# Patient Record
Sex: MALE | Race: WHITE | NOT HISPANIC OR LATINO | Employment: FULL TIME | URBAN - METROPOLITAN AREA
[De-identification: names, ages, dates, MRNs, and addresses within clinical notes are randomized per-mention and may not be internally consistent; named-entity substitution may affect disease eponyms.]

---

## 2017-03-27 ENCOUNTER — TELEPHONE (OUTPATIENT)
Dept: FAMILY MEDICINE CLINIC | Facility: CLINIC | Age: 32
End: 2017-03-27

## 2017-03-27 NOTE — TELEPHONE ENCOUNTER
Lm informing we could work him in tomorrow as a same day to call and let me know    ----- Message from Binta Prasad sent at 3/27/2017  9:35 AM EDT -----  Contact: [phone]  PT WANTS TO SEE DR. DOHERTY FOR A RASH ON HIS HANDS AND CHEST BUT JUST IN CASE YOU CAN'T WORK HIM IN I MADE AN APPT WITH [facility] ON Wednesday. PLEASE CALL AND LET HIM KNOW

## 2017-03-29 ENCOUNTER — OFFICE VISIT (OUTPATIENT)
Dept: FAMILY MEDICINE CLINIC | Facility: CLINIC | Age: 32
End: 2017-03-29

## 2017-03-29 VITALS
WEIGHT: 243 LBS | SYSTOLIC BLOOD PRESSURE: 120 MMHG | TEMPERATURE: 98.3 F | HEART RATE: 91 BPM | BODY MASS INDEX: 39.05 KG/M2 | DIASTOLIC BLOOD PRESSURE: 68 MMHG | OXYGEN SATURATION: 98 % | HEIGHT: 66 IN

## 2017-03-29 DIAGNOSIS — J30.2 SEASONAL ALLERGIC RHINITIS, UNSPECIFIED ALLERGIC RHINITIS TRIGGER: ICD-10-CM

## 2017-03-29 DIAGNOSIS — L30.9 DERMATITIS: Primary | ICD-10-CM

## 2017-03-29 DIAGNOSIS — R21 RASH: ICD-10-CM

## 2017-03-29 PROCEDURE — 96372 THER/PROPH/DIAG INJ SC/IM: CPT | Performed by: NURSE PRACTITIONER

## 2017-03-29 PROCEDURE — 99213 OFFICE O/P EST LOW 20 MIN: CPT | Performed by: NURSE PRACTITIONER

## 2017-03-29 RX ORDER — HYDROXYZINE HYDROCHLORIDE 25 MG/1
25 TABLET, FILM COATED ORAL EVERY 8 HOURS PRN
Qty: 60 TABLET | Refills: 0 | Status: SHIPPED | OUTPATIENT
Start: 2017-03-29 | End: 2017-09-08

## 2017-03-29 RX ORDER — METHYLPREDNISOLONE 4 MG/1
TABLET ORAL
Qty: 21 TABLET | Refills: 0 | Status: SHIPPED | OUTPATIENT
Start: 2017-03-29 | End: 2017-09-08

## 2017-03-29 RX ORDER — METHYLPREDNISOLONE ACETATE 40 MG/ML
40 INJECTION, SUSPENSION INTRA-ARTICULAR; INTRALESIONAL; INTRAMUSCULAR; SOFT TISSUE ONCE
Status: COMPLETED | OUTPATIENT
Start: 2017-03-29 | End: 2017-03-29

## 2017-03-29 RX ORDER — TRIAMCINOLONE ACETONIDE 1 MG/G
CREAM TOPICAL 2 TIMES DAILY
Qty: 30 G | Refills: 1 | Status: SHIPPED | OUTPATIENT
Start: 2017-03-29 | End: 2019-05-21 | Stop reason: SDUPTHER

## 2017-03-29 RX ORDER — CETIRIZINE HYDROCHLORIDE 10 MG/1
10 TABLET ORAL DAILY
Qty: 30 TABLET | Refills: 1 | Status: SHIPPED | OUTPATIENT
Start: 2017-03-29 | End: 2017-09-08

## 2017-03-29 RX ORDER — METHYLPREDNISOLONE ACETATE 40 MG/ML
20 INJECTION, SUSPENSION INTRA-ARTICULAR; INTRALESIONAL; INTRAMUSCULAR; SOFT TISSUE ONCE
Status: COMPLETED | OUTPATIENT
Start: 2017-03-29 | End: 2017-03-29

## 2017-03-29 RX ADMIN — METHYLPREDNISOLONE ACETATE 40 MG: 40 INJECTION, SUSPENSION INTRA-ARTICULAR; INTRALESIONAL; INTRAMUSCULAR; SOFT TISSUE at 14:58

## 2017-03-29 RX ADMIN — METHYLPREDNISOLONE ACETATE 20 MG: 40 INJECTION, SUSPENSION INTRA-ARTICULAR; INTRALESIONAL; INTRAMUSCULAR; SOFT TISSUE at 14:58

## 2017-03-29 NOTE — PROGRESS NOTES
Subjective   Rajat Anguiano is a 32 y.o. male.     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    Review of Systems   Constitutional: Negative for fever.   Respiratory: Negative for cough, shortness of breath and wheezing.    Cardiovascular: Negative for chest pain, palpitations and leg swelling.   Gastrointestinal: Negative for nausea and vomiting.   Skin: Positive for rash.        Itching   Neurological: Negative for dizziness and headaches.   All other systems reviewed and are negative.      Objective   Physical Exam   Constitutional: He is oriented to person, place, and time. He appears well-developed and well-nourished.   HENT:   Head: Normocephalic and atraumatic.   Eyes: Conjunctivae and EOM are normal. Pupils are equal, round, and reactive to light.   Cardiovascular: Normal rate, regular rhythm and normal heart sounds.    Pulmonary/Chest: Effort normal and breath sounds normal.   Musculoskeletal: Normal range of motion.   Neurological: He is alert and oriented to person, place, and time.   Skin: Skin is warm and dry. Rash (erythematous papules and  patches B hands, B UE, B posterior knee, B antecubital fossa, B chest) noted.   Psychiatric: He has a normal mood and affect. His behavior is normal. Judgment and thought content normal.   Vitals reviewed.      Assessment/Plan   Rajat was seen today for rash.    Diagnoses and all orders for this visit:    Dermatitis  -     Ambulatory Referral to Allergy  -     Ambulatory Referral to Dermatology    Seasonal allergic rhinitis, unspecified allergic rhinitis trigger  -     methylPREDNISolone acetate (DEPO-medrol) injection 40 mg; Inject 1 mL into the shoulder, thigh, or buttocks 1 (One) Time.  -     methylPREDNISolone acetate (DEPO-medrol) injection 20 mg; Inject 0.5 mL into the shoulder, thigh, or buttocks 1 (One) Time.  -     Ambulatory Referral to Allergy    Rash  -     Ambulatory Referral to Dermatology    Other orders  -     MethylPREDNISolone (MEDROL, STEVEN,) 4 MG tablet; Take as directed on package instructions.  -     hydrOXYzine (ATARAX) 25 MG tablet; Take 1 tablet by mouth Every 8 (Eight) Hours As Needed for Itching.  -     triamcinolone (KENALOG) 0.1 % cream; Apply  topically 2 (Two) Times a Day.  -     cetirizine (zyrTEC) 10 MG tablet; Take 1 tablet by mouth Daily.    depo medrol 60 mg today, start medrol dose pack tomorrow, erx hydroxyzine prn itching, erx triamcinolone for rash and base of neck, gave referral for [facility] and derm pt will schedule own FU apt, start zyrtec 10 mg daily, if sx persist or worsen or vision changes call or RTC or ER

## 2017-03-29 NOTE — PATIENT INSTRUCTIONS
depo medrol 60 mg today, start medrol dose pack tomorrow, erx hydroxyzine prn itching, erx triamcinolone for rash and base of neck, gave referral for [facility] and derm pt will schedule own FU apt, start zyrtec 10 mg daily, if sx persist or worsen or vision changes call or RTC or ER

## 2017-07-24 RX ORDER — LISINOPRIL 10 MG/1
TABLET ORAL
Qty: 30 TABLET | Refills: 0 | Status: SHIPPED | OUTPATIENT
Start: 2017-07-24 | End: 2017-09-08 | Stop reason: SDUPTHER

## 2017-08-23 RX ORDER — LISINOPRIL 10 MG/1
TABLET ORAL
Qty: 30 TABLET | Refills: 0 | Status: SHIPPED | OUTPATIENT
Start: 2017-08-23 | End: 2017-09-08 | Stop reason: SDUPTHER

## 2017-09-08 ENCOUNTER — OFFICE VISIT (OUTPATIENT)
Dept: FAMILY MEDICINE CLINIC | Facility: CLINIC | Age: 32
End: 2017-09-08

## 2017-09-08 VITALS
OXYGEN SATURATION: 96 % | HEIGHT: 66 IN | RESPIRATION RATE: 16 BRPM | DIASTOLIC BLOOD PRESSURE: 58 MMHG | TEMPERATURE: 98.2 F | BODY MASS INDEX: 38.92 KG/M2 | SYSTOLIC BLOOD PRESSURE: 90 MMHG | WEIGHT: 242.2 LBS | HEART RATE: 89 BPM

## 2017-09-08 DIAGNOSIS — R10.9 RIGHT FLANK PAIN: ICD-10-CM

## 2017-09-08 DIAGNOSIS — M54.50 ACUTE MIDLINE LOW BACK PAIN WITHOUT SCIATICA: Primary | ICD-10-CM

## 2017-09-08 LAB
BACTERIA UR QL AUTO: NORMAL /HPF
BILIRUB UR QL STRIP: NEGATIVE
CLARITY UR: CLEAR
COLOR UR: YELLOW
GLUCOSE UR STRIP-MCNC: NEGATIVE MG/DL
HGB UR QL STRIP.AUTO: NEGATIVE
KETONES UR QL STRIP: NEGATIVE
LEUKOCYTE ESTERASE UR QL STRIP.AUTO: NEGATIVE
NITRITE UR QL STRIP: NEGATIVE
PH UR STRIP.AUTO: 5.5 [PH] (ref 4.6–8)
PROT UR QL STRIP: NEGATIVE
RBC # UR: NORMAL /HPF
REF LAB TEST METHOD: NORMAL
SP GR UR STRIP: 1.02 (ref 1–1.03)
SQUAMOUS #/AREA URNS HPF: NORMAL /HPF
UROBILINOGEN UR QL STRIP: NORMAL
WBC UR QL AUTO: NORMAL /HPF

## 2017-09-08 PROCEDURE — 99213 OFFICE O/P EST LOW 20 MIN: CPT | Performed by: NURSE PRACTITIONER

## 2017-09-08 PROCEDURE — 81001 URINALYSIS AUTO W/SCOPE: CPT | Performed by: NURSE PRACTITIONER

## 2017-09-08 PROCEDURE — 72100 X-RAY EXAM L-S SPINE 2/3 VWS: CPT | Performed by: NURSE PRACTITIONER

## 2017-09-08 RX ORDER — LISINOPRIL 10 MG/1
10 TABLET ORAL
COMMUNITY
End: 2018-05-09

## 2017-09-08 RX ORDER — CYCLOBENZAPRINE HCL 10 MG
10 TABLET ORAL NIGHTLY PRN
Qty: 30 TABLET | Refills: 0 | Status: SHIPPED | OUTPATIENT
Start: 2017-09-08 | End: 2019-05-21 | Stop reason: SDUPTHER

## 2017-09-08 NOTE — PROGRESS NOTES
Subjective   Rajat Anguiano is a 32 y.o. male.     History of Present Illness   C/o low back and right flank pain, he states he has dull pain on right side, he states it gets better throughout the day. He has a hx of LBP, he states symptoms started a couple of weeks ago, he did physical activity with work and got worse. He denies fever, he denies urinary symptoms. He states his back is stiff, he has had some sharp pain, denies any hx of kidney stones, he tried ice and aleve at home, he had a lumbar xray 7/16. He states the aleve helps some. Back pain ongoing, no injury that he recalls, he saw a chiropractor which helped.     The following portions of the patient's history were reviewed and updated as appropriate: allergies, current medications, past family history, past medical history, past social history, past surgical history and problem list.    Review of Systems   Constitutional: Negative for chills, diaphoresis and fever.   Respiratory: Negative for shortness of breath.    Cardiovascular: Negative for chest pain.   Genitourinary: Positive for flank pain. Negative for difficulty urinating, discharge, frequency and penile pain.   Musculoskeletal: Positive for arthralgias, back pain and myalgias.   Skin: Negative for pallor.   Neurological: Negative for dizziness, light-headedness and headaches.   All other systems reviewed and are negative.      Objective   Physical Exam   Constitutional: He is oriented to person, place, and time. He appears well-developed and well-nourished.   HENT:   Head: Normocephalic.   Eyes: Pupils are equal, round, and reactive to light.   Neck: Neck supple.   Cardiovascular: Normal rate, regular rhythm and normal heart sounds.    Pulmonary/Chest: Effort normal and breath sounds normal.   Abdominal: Soft. Bowel sounds are normal. He exhibits no distension and no mass. There is no hepatosplenomegaly. There is no tenderness. There is no CVA tenderness.   Musculoskeletal:        Lumbar back:  He exhibits decreased range of motion and tenderness. He exhibits no bony tenderness, no pain and no spasm.   Neurological: He is alert and oriented to person, place, and time.   Skin: Skin is warm and dry.   Psychiatric: He has a normal mood and affect. His behavior is normal. Judgment and thought content normal.   Nursing note and vitals reviewed.  lumbar xray today for low back pain, compared to xray from 7/2/16, shows NAD, awaiting radiology over read.     Assessment/Plan   Rajat was seen today for flank pain.    Diagnoses and all orders for this visit:    Acute midline low back pain without sciatica  -     Urinalysis With Microscopic  -     Urinalysis  -     Urinalysis, Microscopic Only  -     Cancel: XR Spine Lumbar 4+ View (In Office)  -     XR Spine Lumbar 2 or 3 View (In Office)    Right flank pain  -     Urinalysis With Microscopic  -     Urinalysis  -     Urinalysis, Microscopic Only    Other orders  -     cyclobenzaprine (FLEXERIL) 10 MG tablet; Take 1 tablet by mouth At Night As Needed for Muscle Spasms.    UA today, WNL.  May cont to use heat or ice alternating as needed.  Lumbar xray today, will call with results.  Flexeril 10mg nightly as needed for pain or spasm.  May cont aleve OTC as needed.   Pending xray will consider mri, ortho, and PT.   Increase fluid intake, get plenty of rest.   Patient agrees with plan of care and understands instructions. Call if worsening symptoms or any problems or concerns.

## 2017-09-08 NOTE — PATIENT INSTRUCTIONS
UA today, WNL.  May cont to use heat or ice alternating as needed.  Lumbar xray today, will call with results.  Flexeril 10mg nightly as needed for pain or spasm.  May cont aleve OTC as needed.   Pending xray will consider mri, ortho, and PT.   Increase fluid intake, get plenty of rest.   Patient agrees with plan of care and understands instructions. Call if worsening symptoms or any problems or concerns.

## 2017-09-13 ENCOUNTER — TELEPHONE (OUTPATIENT)
Dept: FAMILY MEDICINE CLINIC | Facility: CLINIC | Age: 32
End: 2017-09-13

## 2017-09-13 DIAGNOSIS — M43.00 SPONDYLOLYSIS: Primary | ICD-10-CM

## 2017-09-13 NOTE — TELEPHONE ENCOUNTER
----- Message from CHAUNCEY Ramirez sent at 9/13/2017  2:58 PM EDT -----  Please call patient with results. Xray WNL< shows acute spondolylosis at L5-S1, will refer to ortho for this and call with appt.    LMTRC

## 2017-09-15 ENCOUNTER — TELEPHONE (OUTPATIENT)
Dept: FAMILY MEDICINE CLINIC | Facility: CLINIC | Age: 32
End: 2017-09-15

## 2017-09-21 RX ORDER — LISINOPRIL 10 MG/1
TABLET ORAL
Qty: 30 TABLET | Refills: 0 | Status: SHIPPED | OUTPATIENT
Start: 2017-09-21 | End: 2018-05-09 | Stop reason: SDUPTHER

## 2017-10-18 RX ORDER — LISINOPRIL 10 MG/1
TABLET ORAL
Qty: 30 TABLET | Refills: 0 | Status: SHIPPED | OUTPATIENT
Start: 2017-10-18 | End: 2017-11-15 | Stop reason: SDUPTHER

## 2017-11-15 RX ORDER — LISINOPRIL 10 MG/1
10 TABLET ORAL DAILY
Qty: 30 TABLET | Refills: 5 | Status: SHIPPED | OUTPATIENT
Start: 2017-11-15 | End: 2018-05-09

## 2018-05-09 ENCOUNTER — OFFICE VISIT (OUTPATIENT)
Dept: FAMILY MEDICINE CLINIC | Facility: CLINIC | Age: 33
End: 2018-05-09

## 2018-05-09 VITALS
DIASTOLIC BLOOD PRESSURE: 60 MMHG | OXYGEN SATURATION: 98 % | BODY MASS INDEX: 37.7 KG/M2 | HEART RATE: 94 BPM | TEMPERATURE: 98.7 F | HEIGHT: 66 IN | RESPIRATION RATE: 16 BRPM | SYSTOLIC BLOOD PRESSURE: 90 MMHG | WEIGHT: 234.6 LBS

## 2018-05-09 DIAGNOSIS — Z87.898 H/O EPISTAXIS: ICD-10-CM

## 2018-05-09 DIAGNOSIS — I10 ESSENTIAL HYPERTENSION: Primary | ICD-10-CM

## 2018-05-09 DIAGNOSIS — F41.9 ANXIETY: ICD-10-CM

## 2018-05-09 DIAGNOSIS — Z00.00 HEALTHCARE MAINTENANCE: ICD-10-CM

## 2018-05-09 LAB
ALBUMIN SERPL-MCNC: 4.8 G/DL (ref 3.5–5.2)
ALBUMIN/GLOB SERPL: 1.6 G/DL
ALP SERPL-CCNC: 53 U/L (ref 39–117)
ALT SERPL W P-5'-P-CCNC: 33 U/L (ref 1–41)
ANION GAP SERPL CALCULATED.3IONS-SCNC: 12.8 MMOL/L
AST SERPL-CCNC: 23 U/L (ref 1–40)
BILIRUB SERPL-MCNC: 0.6 MG/DL (ref 0.1–1.2)
BUN BLD-MCNC: 12 MG/DL (ref 6–20)
BUN/CREAT SERPL: 13.5 (ref 7–25)
CALCIUM SPEC-SCNC: 10.4 MG/DL (ref 8.6–10.5)
CHLORIDE SERPL-SCNC: 99 MMOL/L (ref 98–107)
CO2 SERPL-SCNC: 25.2 MMOL/L (ref 22–29)
CREAT BLD-MCNC: 0.89 MG/DL (ref 0.76–1.27)
ERYTHROCYTE [DISTWIDTH] IN BLOOD BY AUTOMATED COUNT: 12.8 % (ref 4.5–15)
GFR SERPL CREATININE-BSD FRML MDRD: 100 ML/MIN/1.73
GLOBULIN UR ELPH-MCNC: 3 GM/DL
GLUCOSE BLD-MCNC: 85 MG/DL (ref 65–99)
HCT VFR BLD AUTO: 48 % (ref 35–60)
HGB BLD-MCNC: 15.9 G/DL (ref 13.5–18)
LYMPHOCYTES # BLD AUTO: 3 10*3/MM3 (ref 1.2–3.4)
LYMPHOCYTES NFR BLD AUTO: 31.5 % (ref 21–51)
MCH RBC QN AUTO: 28.2 PG (ref 26.1–33.1)
MCHC RBC AUTO-ENTMCNC: 33.1 G/DL (ref 33–37)
MCV RBC AUTO: 85.1 FL (ref 80–99)
MONOCYTES # BLD AUTO: 0.8 10*3/MM3 (ref 0.1–0.6)
MONOCYTES NFR BLD AUTO: 8.7 % (ref 2–9)
NEUTROPHILS # BLD AUTO: 5.7 10*3/MM3 (ref 1.4–6.5)
NEUTROPHILS NFR BLD AUTO: 59.8 % (ref 42–75)
PLATELET # BLD AUTO: 256 10*3/MM3 (ref 150–450)
PMV BLD AUTO: 8.3 FL (ref 7.1–10.5)
POTASSIUM BLD-SCNC: 4.4 MMOL/L (ref 3.5–5.2)
PROT SERPL-MCNC: 7.8 G/DL (ref 6–8.5)
RBC # BLD AUTO: 5.64 10*6/MM3 (ref 4–6)
SODIUM BLD-SCNC: 137 MMOL/L (ref 136–145)
TSH SERPL DL<=0.05 MIU/L-ACNC: 1.6 MIU/ML (ref 0.27–4.2)
WBC NRBC COR # BLD: 9.6 10*3/MM3 (ref 4.5–10)

## 2018-05-09 PROCEDURE — 80053 COMPREHEN METABOLIC PANEL: CPT | Performed by: NURSE PRACTITIONER

## 2018-05-09 PROCEDURE — 36415 COLL VENOUS BLD VENIPUNCTURE: CPT | Performed by: NURSE PRACTITIONER

## 2018-05-09 PROCEDURE — 85025 COMPLETE CBC W/AUTO DIFF WBC: CPT | Performed by: NURSE PRACTITIONER

## 2018-05-09 PROCEDURE — 99214 OFFICE O/P EST MOD 30 MIN: CPT | Performed by: NURSE PRACTITIONER

## 2018-05-09 PROCEDURE — 84443 ASSAY THYROID STIM HORMONE: CPT | Performed by: NURSE PRACTITIONER

## 2018-05-09 RX ORDER — HYDROXYZINE HYDROCHLORIDE 25 MG/1
25 TABLET, FILM COATED ORAL NIGHTLY
Qty: 30 TABLET | Refills: 1 | Status: SHIPPED | OUTPATIENT
Start: 2018-05-09 | End: 2018-06-13 | Stop reason: SDUPTHER

## 2018-05-09 RX ORDER — LISINOPRIL 10 MG/1
10 TABLET ORAL DAILY
Qty: 30 TABLET | Refills: 6 | Status: SHIPPED | OUTPATIENT
Start: 2018-05-09 | End: 2018-12-10 | Stop reason: SDUPTHER

## 2018-05-09 NOTE — PROGRESS NOTES
Subjective   Rajat Anguiano is a 33 y.o. male.     History of Present Illness   Here today to BP med refill, currently on lisinopril 10mg daily, he has lost weight, he has been walking on lunch, working on diet, he did take BP meds today. He is over due for labs, not fasting today. He does not check, BP at home, he denies dizziness, HA occasionally, denies CP, SOA, LE edema.   He also c/o anxiety, worse over there past couple of month, feeling nervousness, panic attack in middle of night, he denies any recent stressors, new house but excited, states anxiety started about 4-5 months ago, he states he has trouble staying asleep. He has had depression before, has tried meds on and off, he states he is not feeling depressed but feels anxious.   He states his thoughts run, over thinks.   He also c/o nose bleeds, he had nose cauterized last year, he states he has bloody mucous when blows nose at times, he states more left nose, he denies nose bleeds, he denies sinus pressure, denies drainage.     The following portions of the patient's history were reviewed and updated as appropriate: allergies, current medications, past family history, past medical history, past social history, past surgical history and problem list.    Review of Systems   Constitutional: Negative for chills, diaphoresis and fever.   HENT: Negative for nosebleeds, postnasal drip, rhinorrhea and sinus pressure.    Eyes: Negative for photophobia and visual disturbance.   Respiratory: Negative for cough and shortness of breath.    Cardiovascular: Negative for chest pain, palpitations and leg swelling.   Musculoskeletal: Negative for arthralgias and myalgias.   Neurological: Negative for dizziness, light-headedness and headache.   All other systems reviewed and are negative.      Objective   Physical Exam   Constitutional: He is oriented to person, place, and time. He appears well-developed and well-nourished.   HENT:   Head: Normocephalic.   Nose: Nose  normal.   Mouth/Throat: Uvula is midline, oropharynx is clear and moist and mucous membranes are normal.   Eyes: Pupils are equal, round, and reactive to light.   Neck: Normal range of motion.   Cardiovascular: Normal rate, regular rhythm and normal heart sounds.    Pulses:       Radial pulses are 2+ on the right side, and 2+ on the left side.        Dorsalis pedis pulses are 2+ on the right side, and 2+ on the left side.        Posterior tibial pulses are 2+ on the right side, and 2+ on the left side.   Pulmonary/Chest: Effort normal and breath sounds normal.   Musculoskeletal: Normal range of motion.   Lymphadenopathy:     He has no cervical adenopathy.   Neurological: He is alert and oriented to person, place, and time.   Skin: Skin is warm and dry.   Psychiatric: He has a normal mood and affect. His speech is normal and behavior is normal. Judgment and thought content normal. Cognition and memory are normal.   Nursing note and vitals reviewed.        Assessment/Plan   Rajat was seen today for hypertension, anxiety and nose bleed.    Diagnoses and all orders for this visit:    Essential hypertension  -     CBC & Differential  -     Comprehensive Metabolic Panel  -     TSH  -     Lipid Panel; Future  -     CBC Auto Differential    Healthcare maintenance  -     CBC & Differential  -     Comprehensive Metabolic Panel  -     TSH  -     Lipid Panel; Future  -     CBC Auto Differential    H/O epistaxis    Anxiety    Other orders  -     lisinopril (PRINIVIL,ZESTRIL) 10 MG tablet; Take 1 tablet by mouth Daily.  -     hydrOXYzine (ATARAX) 25 MG tablet; Take 1 tablet by mouth Every Night.      Trial hydroxyzine 25mg nightly for anxiety.   Encouraged regular exercise, meditation, sleep hygiene, deep breathing, yoga. Deferred counseling today.   May trial saline nasal spray, humidifier in bedroom.  Decrease lisinopril to 5mg daily, monitor BP at home, call with problems.   Cbc, cmp, tsh today will call with results.   He  will return for fasting lipids.   F/u in 1 month.   Increase fluid intake, get plenty of rest.   Patient agrees with plan of care and understands instructions. Call if worsening symptoms or any problems or concerns.

## 2018-05-09 NOTE — PATIENT INSTRUCTIONS
Trial hydroxyzine 25mg nightly for anxiety.   Encouraged regular exercise, meditation, sleep hygiene, deep breathing, yoga. Deferred counseling today.   May trial saline nasal spray, humidifier in bedroom.  Decrease lisinopril to 5mg daily, monitor BP at home, call with problems.   Cbc, cmp, tsh today will call with results.   He will return for fasting lipids.   F/u in 1 month.   Increase fluid intake, get plenty of rest.   Patient agrees with plan of care and understands instructions. Call if worsening symptoms or any problems or concerns.

## 2018-05-10 ENCOUNTER — TELEPHONE (OUTPATIENT)
Dept: FAMILY MEDICINE CLINIC | Facility: CLINIC | Age: 33
End: 2018-05-10

## 2018-05-10 NOTE — TELEPHONE ENCOUNTER
----- Message from CHAUNCEY Wallace sent at 5/10/2018  8:16 AM EDT -----  Please call patient with results. Thyroid, BS, kidney and liver func is normal.    LMOM informed of lab results

## 2018-05-10 NOTE — TELEPHONE ENCOUNTER
----- Message from CHAUNCEY Wallace sent at 5/9/2018  4:34 PM EDT -----  Please call patient with results. Blood count is normal.    LMOM informed of lab results

## 2018-05-22 RX ORDER — LISINOPRIL 10 MG/1
10 TABLET ORAL DAILY
Qty: 30 TABLET | Refills: 0 | Status: SHIPPED | OUTPATIENT
Start: 2018-05-22 | End: 2018-11-02 | Stop reason: SDUPTHER

## 2018-06-13 ENCOUNTER — TELEPHONE (OUTPATIENT)
Dept: FAMILY MEDICINE CLINIC | Facility: CLINIC | Age: 33
End: 2018-06-13

## 2018-06-13 ENCOUNTER — OFFICE VISIT (OUTPATIENT)
Dept: FAMILY MEDICINE CLINIC | Facility: CLINIC | Age: 33
End: 2018-06-13

## 2018-06-13 VITALS
HEIGHT: 66 IN | DIASTOLIC BLOOD PRESSURE: 68 MMHG | BODY MASS INDEX: 38.35 KG/M2 | SYSTOLIC BLOOD PRESSURE: 112 MMHG | WEIGHT: 238.6 LBS | OXYGEN SATURATION: 98 % | TEMPERATURE: 98.5 F | HEART RATE: 83 BPM

## 2018-06-13 DIAGNOSIS — Z00.00 HEALTHCARE MAINTENANCE: ICD-10-CM

## 2018-06-13 DIAGNOSIS — F41.9 ANXIETY: Primary | ICD-10-CM

## 2018-06-13 DIAGNOSIS — I10 ESSENTIAL HYPERTENSION: ICD-10-CM

## 2018-06-13 LAB
CHOLEST SERPL-MCNC: 151 MG/DL (ref 0–200)
HDLC SERPL-MCNC: 31 MG/DL (ref 40–60)
LDLC SERPL CALC-MCNC: 73 MG/DL (ref 0–100)
LDLC/HDLC SERPL: 2.35 {RATIO}
TRIGL SERPL-MCNC: 235 MG/DL (ref 0–150)
VLDLC SERPL-MCNC: 47 MG/DL (ref 5–40)

## 2018-06-13 PROCEDURE — 36415 COLL VENOUS BLD VENIPUNCTURE: CPT | Performed by: NURSE PRACTITIONER

## 2018-06-13 PROCEDURE — 99213 OFFICE O/P EST LOW 20 MIN: CPT | Performed by: NURSE PRACTITIONER

## 2018-06-13 PROCEDURE — 80061 LIPID PANEL: CPT | Performed by: NURSE PRACTITIONER

## 2018-06-13 RX ORDER — HYDROXYZINE HYDROCHLORIDE 25 MG/1
25 TABLET, FILM COATED ORAL NIGHTLY
Qty: 30 TABLET | Refills: 6 | Status: SHIPPED | OUTPATIENT
Start: 2018-06-13 | End: 2019-02-04 | Stop reason: SDUPTHER

## 2018-06-13 NOTE — PROGRESS NOTES
Subjective   Rajat Anguiano is a 33 y.o. male.     History of Present Illness   Here today to f/u for HTN, he was seen on 5/9/18 for refill, decreased lisinopril to 5mg daily, he states he is still doing diet and exercise, he states he has been working overtime, he denies CP, SOA, change in vision, he denies LE edema, denies HA. He denies smoking. He is also here to f/u for anxiety, he states he was started on hydroxyzine 25mg nightly as needed for anxiety, states this is helping, states he has had panic feeling come on a couple of times, but the feeling passed, states sleep is going well, he states he has some drowsiness in am. He started taking medications earlier. He is fasting for lipids today.     The following portions of the patient's history were reviewed and updated as appropriate: allergies, current medications, past family history, past medical history, past social history, past surgical history and problem list.    Review of Systems   Constitutional: Negative for chills, diaphoresis and fever.   Eyes: Negative for photophobia and visual disturbance.   Respiratory: Negative for cough and shortness of breath.    Cardiovascular: Negative for chest pain and leg swelling.   Musculoskeletal: Negative for arthralgias and myalgias.   Skin: Negative for rash.   Neurological: Negative for dizziness, light-headedness and headache.   Psychiatric/Behavioral: Negative for decreased concentration, self-injury, sleep disturbance, suicidal ideas, depressed mood and stress. The patient is nervous/anxious.    All other systems reviewed and are negative.      Objective   Physical Exam   Constitutional: He is oriented to person, place, and time. He appears well-developed and well-nourished.   HENT:   Head: Normocephalic.   Eyes: Pupils are equal, round, and reactive to light.   Neck: Normal range of motion.   Cardiovascular: Normal rate, regular rhythm and normal heart sounds.    Pulses:       Radial pulses are 2+ on the  right side, and 2+ on the left side.        Dorsalis pedis pulses are 2+ on the right side, and 2+ on the left side.        Posterior tibial pulses are 2+ on the right side, and 2+ on the left side.   Pulmonary/Chest: Effort normal and breath sounds normal.   Musculoskeletal: Normal range of motion.   Lymphadenopathy:     He has no cervical adenopathy.   Neurological: He is alert and oriented to person, place, and time.   Skin: Skin is warm and dry.   Psychiatric: He has a normal mood and affect. His speech is normal and behavior is normal. Judgment and thought content normal. Cognition and memory are normal.   Nursing note and vitals reviewed.        Assessment/Plan   Rajat was seen today for anxiety and hypertension.    Diagnoses and all orders for this visit:    Anxiety    Essential hypertension    Other orders  -     hydrOXYzine (ATARAX) 25 MG tablet; Take 1 tablet by mouth Every Night.        Cont hydroxyzine 25mg nightly as needed for anxiety, cont exercise, diet, deep breathing, relaxation.   Cont lisinopril 5mg daily, monitor BP at home, he may d/c if consistently SBP <120, he will call office.   Encouraged dash diet.   Lipid panel today.  Increase fluid intake, get plenty of rest.   Patient agrees with plan of care and understands instructions. Call if worsening symptoms or any problems or concerns.

## 2018-06-13 NOTE — TELEPHONE ENCOUNTER
----- Message from CHAUNCEY Wallace sent at 6/13/2018  1:24 PM EDT -----  Please call patient with results. Chol slightly elevated, encourage low chol diet, regular exercise, can try fish oil 1000mg OTC daily also. Recheck in about 3 months.    LMOM informed of lab results

## 2018-06-13 NOTE — PATIENT INSTRUCTIONS
Cont hydroxyzine 25mg nightly as needed for anxiety, cont exercise, diet, deep breathing, relaxation.   Cont lisinopril 5mg daily, monitor BP at home, he may d/c if consistently SBP <120, he will call office.   Encouraged dash diet.   Lipid panel today.  Increase fluid intake, get plenty of rest.   Patient agrees with plan of care and understands instructions. Call if worsening symptoms or any problems or concerns.

## 2018-11-02 ENCOUNTER — OFFICE VISIT (OUTPATIENT)
Dept: FAMILY MEDICINE CLINIC | Facility: CLINIC | Age: 33
End: 2018-11-02

## 2018-11-02 VITALS
SYSTOLIC BLOOD PRESSURE: 122 MMHG | TEMPERATURE: 98.5 F | WEIGHT: 233.4 LBS | OXYGEN SATURATION: 98 % | BODY MASS INDEX: 37.51 KG/M2 | HEIGHT: 66 IN | HEART RATE: 105 BPM | DIASTOLIC BLOOD PRESSURE: 68 MMHG

## 2018-11-02 DIAGNOSIS — R53.83 FATIGUE, UNSPECIFIED TYPE: Primary | ICD-10-CM

## 2018-11-02 DIAGNOSIS — R06.83 SNORING: ICD-10-CM

## 2018-11-02 DIAGNOSIS — I10 HYPERTENSION, ESSENTIAL: ICD-10-CM

## 2018-11-02 LAB
ALBUMIN SERPL-MCNC: 4.5 G/DL (ref 3.5–5.2)
ALBUMIN/GLOB SERPL: 1.5 G/DL
ALP SERPL-CCNC: 62 U/L (ref 39–117)
ALT SERPL W P-5'-P-CCNC: 37 U/L (ref 1–41)
ANION GAP SERPL CALCULATED.3IONS-SCNC: 11.5 MMOL/L
AST SERPL-CCNC: 20 U/L (ref 1–40)
BILIRUB SERPL-MCNC: 0.2 MG/DL (ref 0.1–1.2)
BUN BLD-MCNC: 12 MG/DL (ref 6–20)
BUN/CREAT SERPL: 12.6 (ref 7–25)
CALCIUM SPEC-SCNC: 9.7 MG/DL (ref 8.6–10.5)
CHLORIDE SERPL-SCNC: 106 MMOL/L (ref 98–107)
CO2 SERPL-SCNC: 23.5 MMOL/L (ref 22–29)
CREAT BLD-MCNC: 0.95 MG/DL (ref 0.76–1.27)
ERYTHROCYTE [DISTWIDTH] IN BLOOD BY AUTOMATED COUNT: 12.8 % (ref 4.5–15)
GFR SERPL CREATININE-BSD FRML MDRD: 92 ML/MIN/1.73
GLOBULIN UR ELPH-MCNC: 3.1 GM/DL
GLUCOSE BLD-MCNC: 110 MG/DL (ref 65–99)
HCT VFR BLD AUTO: 48.8 % (ref 35–60)
HGB BLD-MCNC: 15.5 G/DL (ref 13.5–18)
LYMPHOCYTES # BLD AUTO: 2.2 10*3/MM3 (ref 1.2–3.4)
LYMPHOCYTES NFR BLD AUTO: 28.6 % (ref 21–51)
MCH RBC QN AUTO: 28.1 PG (ref 26.1–33.1)
MCHC RBC AUTO-ENTMCNC: 31.8 G/DL (ref 33–37)
MCV RBC AUTO: 88.4 FL (ref 80–99)
MONOCYTES # BLD AUTO: 0.4 10*3/MM3 (ref 0.1–0.6)
MONOCYTES NFR BLD AUTO: 5 % (ref 2–9)
NEUTROPHILS # BLD AUTO: 5.2 10*3/MM3 (ref 1.4–6.5)
NEUTROPHILS NFR BLD AUTO: 66.4 % (ref 42–75)
PLATELET # BLD AUTO: 195 10*3/MM3 (ref 150–450)
PMV BLD AUTO: 8.9 FL (ref 7.1–10.5)
POTASSIUM BLD-SCNC: 4.8 MMOL/L (ref 3.5–5.2)
PROT SERPL-MCNC: 7.6 G/DL (ref 6–8.5)
RBC # BLD AUTO: 5.52 10*6/MM3 (ref 4–6)
SODIUM BLD-SCNC: 141 MMOL/L (ref 136–145)
TSH SERPL DL<=0.05 MIU/L-ACNC: 1.77 MIU/ML (ref 0.27–4.2)
WBC NRBC COR # BLD: 7.8 10*3/MM3 (ref 4.5–10)

## 2018-11-02 PROCEDURE — 80053 COMPREHEN METABOLIC PANEL: CPT | Performed by: INTERNAL MEDICINE

## 2018-11-02 PROCEDURE — 84443 ASSAY THYROID STIM HORMONE: CPT | Performed by: INTERNAL MEDICINE

## 2018-11-02 PROCEDURE — 99214 OFFICE O/P EST MOD 30 MIN: CPT | Performed by: INTERNAL MEDICINE

## 2018-11-02 PROCEDURE — 85025 COMPLETE CBC W/AUTO DIFF WBC: CPT | Performed by: INTERNAL MEDICINE

## 2018-11-02 PROCEDURE — 36415 COLL VENOUS BLD VENIPUNCTURE: CPT | Performed by: INTERNAL MEDICINE

## 2018-11-02 NOTE — PROGRESS NOTES
Subjective   Rajat Anguiano is a 33 y.o. male.   Fatigue for 2mo especially last 3wks  Some snoring  History of Present Illness no known sleep apnea no family history of thyroid or low testosterone the patient is where been more fatigued for 2 months time most noticeable last 3 weeks no weight loss or other changes he feels refreshed after sleeping night get updated view things and disorder wears out after an hour or 2 this happens repeatedly through the day.  No increased lymph nodes and report increased temperature no history of anemia or other clues.  Patients always hot natured but no real changes in heat/cold intolerance.  No document sleep apnea with patient hypertension controlled today is nonsmoker nondrinker family history for both hypertension and depression.  No reported thyroid trouble so family tree patient has no drug allergies.       Review of Systems   Constitutional: Positive for fatigue.   All other systems reviewed and are negative.      Objective   Vitals:    11/02/18 0821   BP: 122/68   Pulse: 105   Temp: 98.5 °F (36.9 °C)   SpO2: 98%   Weight: 106 kg (233 lb 6.4 oz)     Physical Exam   Constitutional: He appears well-developed and well-nourished.   HENT:   Head: Normocephalic and atraumatic.   Eyes: Pupils are equal, round, and reactive to light. Conjunctivae are normal.   Neck: No tracheal deviation present. No thyromegaly present.   Cardiovascular: Normal rate, regular rhythm and normal heart sounds.    Pulmonary/Chest: Effort normal and breath sounds normal.   Abdominal: Soft. Bowel sounds are normal.   No organomegaly present   Musculoskeletal:   No increased cervical, supra/infraclavicular, axillary or femoral lymphadenopathy noted.   Neurological: He is alert.   Unremarkable gait and station.  Alert   Skin: Skin is warm and dry.   Psychiatric: He has a normal mood and affect. His behavior is normal.   Nursing note and vitals reviewed.      No results found for:  INR    Procedures    Assessment/Plan     .  1.  Fatigue undetermined etiology plan await pending lab if negative we'll proceed with sleep lab evaluation    2.  Snoring plan see #1 was a consideration for sleep lab evaluation if other etiologies for fatigue on screening labs are not found.    3.  Hypertension controlled continue present medicine recheck in 6-12 months.    Much of this encounter note is an electronic transcription/translation of spoken language to printed text.  The electronic translation of spoken language may permit erroneous, or at times, nonsensical words or phrases to be inadvertently transcribed.  Although I have reviewed the note for such errors, some may still exist. If there are questions or for further clarification, please contact me.

## 2018-11-03 LAB
TESTOST FREE SERPL-MCNC: 14.6 PG/ML (ref 8.7–25.1)
TESTOST SERPL-MCNC: 342 NG/DL (ref 264–916)

## 2018-11-05 DIAGNOSIS — R53.83 OTHER FATIGUE: Primary | ICD-10-CM

## 2018-12-10 RX ORDER — LISINOPRIL 10 MG/1
10 TABLET ORAL DAILY
Qty: 30 TABLET | Refills: 0 | Status: SHIPPED | OUTPATIENT
Start: 2018-12-10 | End: 2020-04-21

## 2018-12-21 ENCOUNTER — APPOINTMENT (OUTPATIENT)
Dept: SLEEP MEDICINE | Facility: HOSPITAL | Age: 33
End: 2018-12-21
Attending: INTERNAL MEDICINE

## 2019-01-04 ENCOUNTER — OFFICE VISIT (OUTPATIENT)
Dept: SLEEP MEDICINE | Facility: HOSPITAL | Age: 34
End: 2019-01-04
Attending: INTERNAL MEDICINE

## 2019-01-04 VITALS
BODY MASS INDEX: 37.96 KG/M2 | WEIGHT: 236.2 LBS | HEART RATE: 96 BPM | SYSTOLIC BLOOD PRESSURE: 138 MMHG | DIASTOLIC BLOOD PRESSURE: 77 MMHG | OXYGEN SATURATION: 97 % | HEIGHT: 66 IN

## 2019-01-04 DIAGNOSIS — G47.10 HYPERSOMNOLENCE: Primary | ICD-10-CM

## 2019-01-04 DIAGNOSIS — E66.9 OBESITY (BMI 30-39.9): ICD-10-CM

## 2019-01-04 DIAGNOSIS — R06.83 SNORING: ICD-10-CM

## 2019-01-04 DIAGNOSIS — G47.33 OSA (OBSTRUCTIVE SLEEP APNEA): ICD-10-CM

## 2019-01-04 PROCEDURE — G0463 HOSPITAL OUTPT CLINIC VISIT: HCPCS

## 2019-01-04 NOTE — PROGRESS NOTES
"[facility] SLEEP MEDICINE  [address]  3rd Floor  [facility] [address]  [phone]    Referring Physician: Dr. Armendariz  PCP: Crispin Armendariz Jr., MD    Reason for consult:  Sleep disorders of snoring and feeling tired    Rajat Anguiano is a 33 y.o.male was seen in the [facility] today. Loss of energy and tired during day. Wakes up feeling tired and unrefreshed. Very sleepy in 2-3 hrs after awakening. Sleeps from 10p to 6a. He snores loudly at night and awakens with sore throat. Wt gain in past 5 yrs of 20 lbs. He gets sometimes sleepy when he drives. Reports leg jerking at night. Sometimes difficulty sleeping at night.  [place] Sleepiness Score: 11. Caffeine intake 1 per day. Alcohol intake 0 per week.    Rajat Anguiano  has a past medical history of Depression, Fatigue, and Hypertension.     Current Medications:    Current Outpatient Medications:   •  cyclobenzaprine (FLEXERIL) 10 MG tablet, Take 1 tablet by mouth At Night As Needed for Muscle Spasms., Disp: 30 tablet, Rfl: 0  •  hydrOXYzine (ATARAX) 25 MG tablet, Take 1 tablet by mouth Every Night., Disp: 30 tablet, Rfl: 6  •  lisinopril (PRINIVIL,ZESTRIL) 10 MG tablet, TAKE 1 TABLET BY MOUTH DAILY, Disp: 30 tablet, Rfl: 0  •  triamcinolone (KENALOG) 0.1 % cream, Apply  topically 2 (Two) Times a Day., Disp: 30 g, Rfl: 1   also listed in Sleep Questionnaire.    FH: family history includes Depression in his mother and other; Hypertension in his father and other.  SH:  reports that  has never smoked. he has never used smokeless tobacco. Drug use questions deferred to the physician. He reports that he does not drink alcohol.    Pertinent Positive Review of Systems of recurrent nose bleed, anxiety, depression  Rest of Review of Systems was negative as recorded in Sleep Questionnaire.        Vital Signs: /77   Pulse 96   Ht 167.6 cm (66\")   Wt 107 kg (236 lb 3.2 oz)   SpO2 97%   BMI 38.12 kg/m²     Body mass index is 38.12 " kg/m².       Tongue: large      Dentition: good       Pharynx: Posterior pharyngeal pillars are unable to see   Mallampatti: IV (only hard palate visible)        General: Alert. Cooperative. Well developed. No acute distress.             Head:  Normocephalic. Symmetrical. Atraumatic.              Eyes: Sclera clear. No icterus. PERRLA. Normal EOM.             Ears: No deformities. Normal hearing.             Nose: No septal deviation. No drainage.          Throat: No oral lesions. No thrush. Moist mucous membranes.    Chest Wall:  Normal shape. Symmetric expansion with respiration. No tenderness.             Neck:  Trachea midline.           Lungs:  Clear to auscultation bilaterally. No wheezes. No rhonchi. No rales. Respirations regular, even and unlabored.            Heart:  Regular rhythm and normal rate. Normal S1 and S2. No murmur.     Abdomen:  Soft, non-tender and non-distended. Normal bowel sounds. No masses.  Extremities:  Moves all extremities well. No edema.           Pulses: Pulses palpable and equal bilaterally.               Skin: Dry. Intact. No bleeding. No rash.           Neuro: Moves all 4 extremities and cranial nerves grossly intact.  Psychiatric: Normal mood and affect.    Impression:  1. Hypersomnolence    2. Snoring    3. Obesity (BMI 30-39.9)    4. YOVANI (obstructive sleep apnea)        Plan:  Rajat has snoring and daytime sleepiness.  He has difficult to control hypertension.  He will be scheduled for a home sleep test.  He may require further testing with an in lab study.  Discussed treatment options.     This patient has typical features of obstructive sleep apnea with hypersomnolence snoring and apneas along with elevated BMI and classic oropharyngeal structure.  Likelihood of obstructive sleep apnea is high and a polysomnogram study will therefore be requested.      Possible diagnosis and pathophysiology of obstructive sleep apnea was discussed with the patient.  Health risks of untreated  obstructive sleep apnea including cardiovascular risks were discussed.  Patient was cautioned about activities requiring full concentration especially driving at night or for longer distances, and until hypersomnolence is corrected.    Results of sleep testing will be reviewed to see if patient is a candidate for CPAP machine.  Alternatives to therapy include oral mandibular advancing device (OMAD) were discussed. However OMAD is usually only beneficial in mild obstructive sleep apnea.  The benefit of weight loss in reducing severity of obstructive sleep apnea was discussed.  Patient would benefit from adhering to a strict diet to achieve ideal BMI.     Patient will follow up in this clinic after study.    Thank you for allowing me to participate in your patient's care.    Ryan Boone MD    Part of this note may be an electronic transcription/translation of spoken language to printed text using the Dragon Dictation System.

## 2019-01-10 RX ORDER — LISINOPRIL 10 MG/1
10 TABLET ORAL DAILY
Qty: 30 TABLET | Refills: 5 | Status: SHIPPED | OUTPATIENT
Start: 2019-01-10 | End: 2019-05-21 | Stop reason: SDUPTHER

## 2019-01-24 ENCOUNTER — HOSPITAL ENCOUNTER (OUTPATIENT)
Dept: SLEEP MEDICINE | Facility: HOSPITAL | Age: 34
Discharge: HOME | End: 2019-01-24
Attending: INTERNAL MEDICINE | Admitting: INTERNAL MEDICINE

## 2019-01-24 DIAGNOSIS — G47.33 OSA (OBSTRUCTIVE SLEEP APNEA): ICD-10-CM

## 2019-01-24 PROCEDURE — 95806 SLEEP STUDY UNATT&RESP EFFT: CPT

## 2019-02-04 RX ORDER — HYDROXYZINE HYDROCHLORIDE 25 MG/1
25 TABLET, FILM COATED ORAL NIGHTLY
Qty: 30 TABLET | Refills: 0 | Status: SHIPPED | OUTPATIENT
Start: 2019-02-04 | End: 2019-03-05 | Stop reason: SDUPTHER

## 2019-02-05 ENCOUNTER — TELEPHONE (OUTPATIENT)
Dept: SLEEP MEDICINE | Facility: HOSPITAL | Age: 34
End: 2019-02-05

## 2019-02-05 NOTE — TELEPHONE ENCOUNTER
Patient is to return call if he has questions about sleep study results, if he has a preferred DME (sending to [employer]) , or to schedule follow up apointment

## 2019-03-05 RX ORDER — HYDROXYZINE HYDROCHLORIDE 25 MG/1
25 TABLET, FILM COATED ORAL NIGHTLY
Qty: 30 TABLET | Refills: 0 | Status: SHIPPED | OUTPATIENT
Start: 2019-03-05 | End: 2019-04-01 | Stop reason: SDUPTHER

## 2019-03-15 ENCOUNTER — TELEPHONE (OUTPATIENT)
Dept: SLEEP MEDICINE | Facility: HOSPITAL | Age: 34
End: 2019-03-15

## 2019-04-01 RX ORDER — HYDROXYZINE HYDROCHLORIDE 25 MG/1
25 TABLET, FILM COATED ORAL NIGHTLY
Qty: 30 TABLET | Refills: 0 | Status: SHIPPED | OUTPATIENT
Start: 2019-04-01 | End: 2019-04-30 | Stop reason: SDUPTHER

## 2019-04-26 ENCOUNTER — OFFICE VISIT (OUTPATIENT)
Dept: SLEEP MEDICINE | Facility: HOSPITAL | Age: 34
End: 2019-04-26

## 2019-04-26 VITALS
OXYGEN SATURATION: 98 % | SYSTOLIC BLOOD PRESSURE: 119 MMHG | WEIGHT: 231.2 LBS | BODY MASS INDEX: 35.04 KG/M2 | HEART RATE: 70 BPM | DIASTOLIC BLOOD PRESSURE: 65 MMHG | HEIGHT: 68 IN

## 2019-04-26 DIAGNOSIS — E66.9 OBESITY (BMI 30-39.9): ICD-10-CM

## 2019-04-26 DIAGNOSIS — G47.33 OBSTRUCTIVE SLEEP APNEA: Primary | ICD-10-CM

## 2019-04-26 PROCEDURE — G0463 HOSPITAL OUTPT CLINIC VISIT: HCPCS

## 2019-04-26 RX ORDER — FLUTICASONE PROPIONATE 50 MCG
2 SPRAY, SUSPENSION (ML) NASAL NIGHTLY
Qty: 1 BOTTLE | Refills: 5 | Status: SHIPPED | OUTPATIENT
Start: 2019-04-26 | End: 2019-10-23

## 2019-04-26 NOTE — PROGRESS NOTES
"[facility] SLEEP MEDICINE  [address]  3rd Floor  [facility] [address]  [phone]    PCP: Crispin Armendariz Jr., MD    Reason for visit:  Sleep disorders: YOVANI    Rajat is a 34 y.o.male who was seen in the Sleep Disorders Center today. He was setup with CPAP after HST and is here to review. He sleeps from 11p to 6a. He uses ffm, fits well, no air leaks. He wakes up with more energy, no EDS, tolerating well. Having very dry mouth. He has tried several masks. Has not tried chin strap. He used to have nasal congestion.  [place] Sleepiness Scale is 8. Caffeine 1 per day. Alcohol 0 per week.    Rajat  reports that he has never smoked. He has never used smokeless tobacco.    Pertinent Positive Review of Systems of anxiety, depression  Rest of Review of Systems was negative as recorded in Sleep Questionnaire.    Patient  has a past medical history of Depression, Fatigue, and Hypertension.     Current Medications:    Current Outpatient Medications:   •  cyclobenzaprine (FLEXERIL) 10 MG tablet, Take 1 tablet by mouth At Night As Needed for Muscle Spasms., Disp: 30 tablet, Rfl: 0  •  fluticasone (FLONASE) 50 MCG/ACT nasal spray, 2 sprays into the nostril(s) as directed by provider Every Night for 180 days., Disp: 1 bottle, Rfl: 5  •  hydrOXYzine (ATARAX) 25 MG tablet, TAKE 1 TABLET BY MOUTH EVERY NIGHT, Disp: 30 tablet, Rfl: 0  •  lisinopril (PRINIVIL,ZESTRIL) 10 MG tablet, TAKE 1 TABLET BY MOUTH DAILY, Disp: 30 tablet, Rfl: 0  •  lisinopril (PRINIVIL,ZESTRIL) 10 MG tablet, TAKE 1 TABLET BY MOUTH DAILY, Disp: 30 tablet, Rfl: 5  •  triamcinolone (KENALOG) 0.1 % cream, Apply  topically 2 (Two) Times a Day., Disp: 30 g, Rfl: 1   also entered in Sleep Questionnaire         Vital Signs: /65 (BP Location: Left arm, Patient Position: Sitting)   Pulse 70   Ht 172.7 cm (68\")   Wt 105 kg (231 lb 3.2 oz)   SpO2 98%   BMI 35.15 kg/m²     Body mass index is 35.15 kg/m².       Tongue: large      " Dentition: good       Pharynx: Posterior pharyngeal pillars are unable to see   Mallampatti: IV (only hard palate visible)        General: Alert. Cooperative. Well developed. No acute distress.             Head:  Normocephalic. Symmetrical. Atraumatic.              Nose: No septal deviation. No drainage. Nasal bridge irritation.          Throat: No oral lesions. No thrush. Moist mucous membranes.    Chest Wall:  Normal shape. Symmetric expansion with respiration. No tenderness.             Neck:  Trachea midline.           Lungs:  Clear to auscultation bilaterally. No wheezes. No rhonchi. No rales. Respirations regular, even and unlabored.            Heart:  Regular rhythm and normal rate. Normal S1 and S2. No murmur.     Abdomen:  Soft, non-tender and non-distended. Normal bowel sounds. No masses.  Extremities:  Moves all extremities well. No edema.    Psychiatric: Normal mood and affect.    Study:  · 1/25/19  The patient tolerated the home sleep testing with monitoring time of 455 minutes. The data obtained make this a technically adequate study. The apnea hypopneas index(AHI) was 35.3 per sleep hour.  The AHI during supine position was 41.4 per sleep hour. Mean heart rate of 68.3 BPM.  Snoring was noted 45.4% of sleep time. Lowest oxygen saturation during the study was 80%. Saturation below 89% was noted for 12.7 mins.     Testing:  · Compliance and set up is 6 hours 33 minutes.  AHI 2.8.  Average pressure 12 cm.    DME Company: [employer]    Impression:  1. Obstructive sleep apnea    2. Obesity (BMI 30-39.9)        Plan:  Rajat is compliant. Try different style masks, change regularly.    Having some dry mouth.  Use chin strap, flonase.    I reiterated the importance of effective treatment of obstructive sleep apnea with PAP machine.  Cardiovascular health risks of untreated sleep apnea were again reviewed.  Patient was asked to remain cautious if there is persistent hypersomnolence. The benefit of weight loss in  reducing severity of obstructive sleep apnea was discussed.  Patient would benefit from adhering to a strict diet to achieve ideal BMI.     Change of PAP supplies regularly is important for effective use.  Change of cushion on the mask or plugs on nasal pillows along with disposable filters once every month and change of mask frame, tubing, headgear and Velcro straps every 6 months at the minimum was reiterated.    This patient is compliant with PAP machine and benefits from its use.  Apnea hypopneas index is corrected/improved.  Daytime hypersomnolence has resolved.     Patient will follow up in this clinic in 6 months.    Thank you for allowing me to participate in your patient's care.    Ryan Boone MD    Part of this note may be an electronic transcription/translation of spoken language to printed text using the Dragon Dictation System.

## 2019-04-30 RX ORDER — HYDROXYZINE HYDROCHLORIDE 25 MG/1
25 TABLET, FILM COATED ORAL NIGHTLY
Qty: 30 TABLET | Refills: 0 | Status: SHIPPED | OUTPATIENT
Start: 2019-04-30 | End: 2019-05-28 | Stop reason: SDUPTHER

## 2019-05-21 ENCOUNTER — OFFICE VISIT (OUTPATIENT)
Dept: FAMILY MEDICINE CLINIC | Facility: CLINIC | Age: 34
End: 2019-05-21

## 2019-05-21 VITALS
OXYGEN SATURATION: 97 % | DIASTOLIC BLOOD PRESSURE: 92 MMHG | SYSTOLIC BLOOD PRESSURE: 130 MMHG | BODY MASS INDEX: 38.6 KG/M2 | WEIGHT: 240.2 LBS | TEMPERATURE: 98.6 F | HEIGHT: 66 IN | HEART RATE: 87 BPM

## 2019-05-21 DIAGNOSIS — R20.2 PARESTHESIAS IN LEFT HAND: ICD-10-CM

## 2019-05-21 DIAGNOSIS — R25.1 TREMORS OF NERVOUS SYSTEM: Primary | ICD-10-CM

## 2019-05-21 LAB
ANION GAP SERPL CALCULATED.3IONS-SCNC: 12.8 MMOL/L
BUN BLD-MCNC: 9 MG/DL (ref 6–20)
BUN/CREAT SERPL: 8.8 (ref 7–25)
CALCIUM SPEC-SCNC: 9.6 MG/DL (ref 8.6–10.5)
CHLORIDE SERPL-SCNC: 102 MMOL/L (ref 98–107)
CO2 SERPL-SCNC: 25.2 MMOL/L (ref 22–29)
CREAT BLD-MCNC: 1.02 MG/DL (ref 0.76–1.27)
GFR SERPL CREATININE-BSD FRML MDRD: 85 ML/MIN/1.73
GLUCOSE BLD-MCNC: 90 MG/DL (ref 65–99)
POTASSIUM BLD-SCNC: 4.7 MMOL/L (ref 3.5–5.2)
SODIUM BLD-SCNC: 140 MMOL/L (ref 136–145)
TSH SERPL DL<=0.05 MIU/L-ACNC: 2.17 MIU/ML (ref 0.27–4.2)
VIT B12 BLD-MCNC: 548 PG/ML (ref 211–946)

## 2019-05-21 PROCEDURE — 84443 ASSAY THYROID STIM HORMONE: CPT | Performed by: INTERNAL MEDICINE

## 2019-05-21 PROCEDURE — 82607 VITAMIN B-12: CPT | Performed by: INTERNAL MEDICINE

## 2019-05-21 PROCEDURE — 80048 BASIC METABOLIC PNL TOTAL CA: CPT | Performed by: INTERNAL MEDICINE

## 2019-05-21 PROCEDURE — 99214 OFFICE O/P EST MOD 30 MIN: CPT | Performed by: INTERNAL MEDICINE

## 2019-05-21 PROCEDURE — 36415 COLL VENOUS BLD VENIPUNCTURE: CPT | Performed by: INTERNAL MEDICINE

## 2019-05-21 NOTE — PROGRESS NOTES
Subjective   Rajat Anguiano is a 34 y.o. male.   Complain of fine intermittent tremor of both hands initially left down the right.  History of Present Illness   In her tremors have been mild going on for several months time.  Perhaps you are more seems to occur more at work type setting the patient cannot overtly say this is clearly worse with stress.  Does on occasion have problems with finding the correct word.  No TIA type symptoms are noted.  No loss of vision noted patient did play some soccer is having careless spells where he was somewhat dazed for a short period of time with hitting the ball.  No overt concussive type symptoms with LOC.  No other head injuries described.  Nothing else of neurologic nature.  No family history for MS or Parkinson's.  Patient does have a history of hypertension.  Patient is historically somewhat hot natured.  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  Patient is a non-smoker.  Family history for depression and hypertension  Review of Systems   Neurological: Positive for tremors.   All other systems reviewed and are negative.      Objective   Vitals:    05/21/19 1003   BP: 130/92   Pulse: 87   Temp: 98.6 °F (37 °C)   SpO2: 97%   Weight: 109 kg (240 lb 3.2 oz)     Physical Exam   Constitutional: He appears well-developed and well-nourished.   HENT:   Head: Normocephalic and atraumatic.   Eyes: Conjunctivae are normal. Pupils are equal, round, and reactive to light.   Cardiovascular: Normal rate, regular rhythm and normal heart sounds.   Pulmonary/Chest: Effort normal and breath sounds normal.   Neurological: He is alert.   Unremarkable gait and station negative cogwheeling at elbow wrist and thumb level.  Upper a extremity reflexes 1+ knee jerks are trace to 1+ bilaterally and symmetrically.   Skin: Skin is warm and dry.   Nursing  note and vitals reviewed.      No results found for: INR    Procedures    Assessment/Plan     1.  Tremors of hands intermittent plan refer to neurology for this    2.  Paresthesias left hand plan B6 50 g daily await pending lab for further follow-up contingent on pending test would also have patient see neurologist with this.    3.  Difficulty with finding words mainly in a work setting where he is somewhat stressed.  Same time as his tremors seem to exacerbate.  Nothing overt.       Much of this encounter note is an electronic transcription/translation of spoken language to printed text.  The electronic translation of spoken language may permit erroneous, or at times, nonsensical words or phrases to be inadvertently transcribed.  Although I have reviewed the note for such errors, some may still exist. If there are questions or for further clarification, please contact me.

## 2019-05-28 RX ORDER — HYDROXYZINE HYDROCHLORIDE 25 MG/1
25 TABLET, FILM COATED ORAL NIGHTLY
Qty: 30 TABLET | Refills: 0 | Status: SHIPPED | OUTPATIENT
Start: 2019-05-28 | End: 2019-07-01 | Stop reason: SDUPTHER

## 2019-06-05 ENCOUNTER — OFFICE VISIT (OUTPATIENT)
Dept: NEUROLOGY | Facility: CLINIC | Age: 34
End: 2019-06-05

## 2019-06-05 VITALS
HEART RATE: 101 BPM | DIASTOLIC BLOOD PRESSURE: 74 MMHG | SYSTOLIC BLOOD PRESSURE: 134 MMHG | WEIGHT: 238.9 LBS | OXYGEN SATURATION: 97 % | BODY MASS INDEX: 38.39 KG/M2 | HEIGHT: 66 IN

## 2019-06-05 DIAGNOSIS — Z00.6 EXAMINATION FOR NORMAL COMPARISON OR CONTROL IN CLINICAL RESEARCH: Primary | ICD-10-CM

## 2019-06-05 PROCEDURE — 99243 OFF/OP CNSLTJ NEW/EST LOW 30: CPT | Performed by: PSYCHIATRY & NEUROLOGY

## 2019-06-05 NOTE — PROGRESS NOTES
"Rajat Anguiano is a 34 y.o. male presents for   Chief Complaint   Patient presents with   • Tremors                CHIEF COMPLAINT:    History of Present Illness  Onset about 7-8 years  Initial location: left index finger  Duration of tremor for 5 seconds until a month ago when it spread to both arms, under the chin, and both legs  Frequency Q2H for few days, then Q hourly in the neck only last week, it is sporadic  Triggers: no reason  Factors none  Interference with function:  \"not a whole lot right now I just want to check it lout right now it is the occasional one just want to make sure it does not get back it does not really affect my function at all  He uses the terms Tremor and Spasms alternatively  Severity: not applicable  Associated neurological symptoms: none except for speech 9see below)  Family history neurologically negative    Neurological review: \"speech difficulty\" comes/goes , where he stutters, reverses words, voice getting softer.  He denies any other neurological symptoms \"I take Atarax for anxiety\"  He has had depression since rayna high on medications for anxiety for 18 months     In Dr Armendariz there is a mention of paresthesias but patient says he does not have them        I have completed the ROS, PH, SH, FH, medications and allergies (no medications)  The following portions of the patient's history were reviewed and updated as appropriate: allergies, current medications, past family history, past medical history, past social history, past surgical history and problem list.        Review of Systems   Constitutional: Positive for activity change and fatigue. Negative for fever.   HENT: Positive for congestion, drooling and postnasal drip.    Eyes: Negative for pain, redness and itching.   Respiratory: Positive for apnea. Negative for shortness of breath and wheezing.    Cardiovascular: Positive for chest pain. Negative for palpitations and leg swelling.   Gastrointestinal: Negative for " constipation, diarrhea, nausea and vomiting.   Endocrine: Negative for cold intolerance, heat intolerance and polydipsia.   Genitourinary: Negative for decreased urine volume, difficulty urinating and urgency.   Musculoskeletal: Positive for back pain. Negative for neck pain and neck stiffness.   Skin: Negative for color change, rash and wound.   Allergic/Immunologic: Positive for environmental allergies. Negative for food allergies and immunocompromised state.   Neurological: Positive for tremors and speech difficulty. Negative for dizziness, seizures, syncope, facial asymmetry, weakness, light-headedness, numbness and headaches.   Hematological: Negative for adenopathy. Does not bruise/bleed easily.   Psychiatric/Behavioral: Positive for sleep disturbance. Negative for agitation, behavioral problems, confusion, decreased concentration, dysphoric mood, hallucinations, self-injury and suicidal ideas. The patient is nervous/anxious. The patient is not hyperactive.          Past Medical History:   Diagnosis Date   • Anxiety    • Asthma    • Depression    • Environmental allergies    • Fatigue    • Hypertension    • Sleep apnea          Social History     Socioeconomic History   • Marital status: Single     Spouse name: Not on file   • Number of children: Not on file   • Years of education: Not on file   • Highest education level: Not on file   Tobacco Use   • Smoking status: Never Smoker   • Smokeless tobacco: Never Used   Substance and Sexual Activity   • Alcohol use: No     Comment: Once in 2 years   • Drug use: Defer   • Sexual activity: Defer          Family History   Problem Relation Age of Onset   • Depression Other    • Hypertension Other    • Depression Mother    • Hypertension Father    • No Known Problems Maternal Grandmother    • No Known Problems Maternal Grandfather    • Dementia Paternal Grandmother    • Heart disease Paternal Grandfather            Current Outpatient Medications:   •  fluticasone  (FLONASE) 50 MCG/ACT nasal spray, 2 sprays into the nostril(s) as directed by provider Every Night for 180 days., Disp: 1 bottle, Rfl: 5  •  hydrOXYzine (ATARAX) 25 MG tablet, TAKE 1 TABLET BY MOUTH EVERY NIGHT, Disp: 30 tablet, Rfl: 0  •  lisinopril (PRINIVIL,ZESTRIL) 10 MG tablet, TAKE 1 TABLET BY MOUTH DAILY, Disp: 30 tablet, Rfl: 0      There were no vitals filed for this visit.      Physical Exam   Constitutional: He is oriented to person, place, and time. He appears well-developed and well-nourished. No distress.   Neurological: He is oriented to person, place, and time. He has normal strength. He has a normal Finger-Nose-Finger Test, a normal Heel to Shin Test, a normal Romberg Test and a normal Tandem Gait Test. Gait normal.   Reflex Scores:       Tricep reflexes are 1+ on the right side and 1+ on the left side.       Bicep reflexes are 1+ on the right side and 1+ on the left side.       Brachioradialis reflexes are 1+ on the right side and 1+ on the left side.       Patellar reflexes are 1+ on the right side and 1+ on the left side.       Achilles reflexes are 1+ on the right side and 1+ on the left side.  Skin: He is not diaphoretic.   Psychiatric: He has a normal mood and affect. His speech is normal and behavior is normal. Judgment and thought content normal.   Nursing note and vitals reviewed.        Neurologic Exam     Mental Status   Oriented to person, place, and time.   Registration: recalls 3 of 3 objects. Recall at 5 minutes: recalls 3 of 3 objects. Follows 3 step commands.   Attention: normal. Concentration: normal.   Speech: speech is normal   Level of consciousness: alert  Knowledge: consistent with education. Able to perform simple calculations.   Able to name object. Able to read. Able to repeat. Able to write. Normal comprehension.     Cranial Nerves   Cranial nerves II through XII intact.     Motor Exam   Muscle bulk: normal  Overall muscle tone: normal  Right arm tone: normal  Left arm  tone: normal  Right arm pronator drift: absent  Left arm pronator drift: absent  Right leg tone: normal  Left leg tone: normal    Strength   Strength 5/5 throughout.     Sensory Exam   Light touch normal.   Vibration normal.   Proprioception normal.   Pinprick normal.   Graphesthesia: normal  Stereognosis: normal    Gait, Coordination, and Reflexes     Gait  Gait: normal    Coordination   Romberg: negative  Finger to nose coordination: normal  Heel to shin coordination: normal  Tandem walking coordination: normal    Tremor   Resting tremor: absent  Intention tremor: absent  Action tremor: absent    Reflexes   Right brachioradialis: 1+  Left brachioradialis: 1+  Right biceps: 1+  Left biceps: 1+  Right triceps: 1+  Left triceps: 1+  Right patellar: 1+  Left patellar: 1+  Right achilles: 1+  Left achilles: 1+  Right : 1+  Left : 1+  Right Chau: absent  Left Chau: absent  Right ankle clonus: absent  Left ankle clonus: absent  Right pendular knee jerk: absent  Left pendular knee jerk: absent          Office Visit on 05/21/2019   Component Date Value Ref Range Status   • TSH 05/21/2019 2.170  0.270 - 4.200 mIU/mL Final   • Glucose 05/21/2019 90  65 - 99 mg/dL Final   • BUN 05/21/2019 9  6 - 20 mg/dL Final   • Creatinine 05/21/2019 1.02  0.76 - 1.27 mg/dL Final   • Sodium 05/21/2019 140  136 - 145 mmol/L Final   • Potassium 05/21/2019 4.7  3.5 - 5.2 mmol/L Final   • Chloride 05/21/2019 102  98 - 107 mmol/L Final   • CO2 05/21/2019 25.2  22.0 - 29.0 mmol/L Final   • Calcium 05/21/2019 9.6  8.6 - 10.5 mg/dL Final   • eGFR Non African Amer 05/21/2019 85  >60 mL/min/1.73 Final   • BUN/Creatinine Ratio 05/21/2019 8.8  7.0 - 25.0 Final   • Anion Gap 05/21/2019 12.8  mmol/L Final   • Vitamin B-12 05/21/2019 548  211 - 946 pg/mL Final            REVIEW OF STUDIES:      DIAGNOSIS, IMPRESSION AND PLAN:  Perfectly normal exam  Reassurance  PRN return

## 2019-07-01 RX ORDER — HYDROXYZINE HYDROCHLORIDE 25 MG/1
25 TABLET, FILM COATED ORAL NIGHTLY
Qty: 30 TABLET | Refills: 0 | Status: SHIPPED | OUTPATIENT
Start: 2019-07-01 | End: 2019-07-28 | Stop reason: SDUPTHER

## 2019-07-29 RX ORDER — LISINOPRIL 10 MG/1
10 TABLET ORAL DAILY
Qty: 30 TABLET | Refills: 0 | Status: SHIPPED | OUTPATIENT
Start: 2019-07-29 | End: 2019-08-25 | Stop reason: SDUPTHER

## 2019-07-29 RX ORDER — HYDROXYZINE HYDROCHLORIDE 25 MG/1
TABLET, FILM COATED ORAL
Qty: 30 TABLET | Refills: 0 | Status: SHIPPED | OUTPATIENT
Start: 2019-07-29 | End: 2019-08-26 | Stop reason: SDUPTHER

## 2019-08-26 RX ORDER — HYDROXYZINE HYDROCHLORIDE 25 MG/1
TABLET, FILM COATED ORAL
Qty: 30 TABLET | Refills: 0 | Status: SHIPPED | OUTPATIENT
Start: 2019-08-26 | End: 2019-09-22 | Stop reason: SDUPTHER

## 2019-08-26 RX ORDER — LISINOPRIL 10 MG/1
10 TABLET ORAL DAILY
Qty: 30 TABLET | Refills: 0 | Status: SHIPPED | OUTPATIENT
Start: 2019-08-26 | End: 2019-09-22 | Stop reason: SDUPTHER

## 2019-09-23 RX ORDER — HYDROXYZINE HYDROCHLORIDE 25 MG/1
TABLET, FILM COATED ORAL
Qty: 30 TABLET | Refills: 0 | Status: SHIPPED | OUTPATIENT
Start: 2019-09-23 | End: 2019-10-20 | Stop reason: SDUPTHER

## 2019-09-23 RX ORDER — LISINOPRIL 10 MG/1
10 TABLET ORAL DAILY
Qty: 30 TABLET | Refills: 0 | Status: SHIPPED | OUTPATIENT
Start: 2019-09-23 | End: 2019-10-20 | Stop reason: SDUPTHER

## 2019-10-21 RX ORDER — LISINOPRIL 10 MG/1
10 TABLET ORAL DAILY
Qty: 30 TABLET | Refills: 0 | Status: SHIPPED | OUTPATIENT
Start: 2019-10-21 | End: 2019-11-18 | Stop reason: SDUPTHER

## 2019-10-21 RX ORDER — HYDROXYZINE HYDROCHLORIDE 25 MG/1
TABLET, FILM COATED ORAL
Qty: 30 TABLET | Refills: 0 | Status: SHIPPED | OUTPATIENT
Start: 2019-10-21 | End: 2019-11-18 | Stop reason: SDUPTHER

## 2019-10-28 ENCOUNTER — APPOINTMENT (OUTPATIENT)
Dept: SLEEP MEDICINE | Facility: HOSPITAL | Age: 34
End: 2019-10-28

## 2019-11-18 RX ORDER — HYDROXYZINE HYDROCHLORIDE 25 MG/1
TABLET, FILM COATED ORAL
Qty: 30 TABLET | Refills: 0 | Status: SHIPPED | OUTPATIENT
Start: 2019-11-18 | End: 2019-12-19

## 2019-11-18 RX ORDER — LISINOPRIL 10 MG/1
10 TABLET ORAL DAILY
Qty: 30 TABLET | Refills: 0 | Status: SHIPPED | OUTPATIENT
Start: 2019-11-18 | End: 2019-12-19

## 2019-12-19 RX ORDER — LISINOPRIL 10 MG/1
10 TABLET ORAL DAILY
Qty: 30 TABLET | Refills: 0 | Status: SHIPPED | OUTPATIENT
Start: 2019-12-19 | End: 2020-01-16 | Stop reason: SDUPTHER

## 2019-12-19 RX ORDER — HYDROXYZINE HYDROCHLORIDE 25 MG/1
TABLET, FILM COATED ORAL
Qty: 30 TABLET | Refills: 0 | Status: SHIPPED | OUTPATIENT
Start: 2019-12-19 | End: 2020-04-21

## 2020-01-16 ENCOUNTER — OFFICE VISIT (OUTPATIENT)
Dept: FAMILY MEDICINE CLINIC | Facility: CLINIC | Age: 35
End: 2020-01-16

## 2020-01-16 VITALS
BODY MASS INDEX: 38.73 KG/M2 | WEIGHT: 241 LBS | HEIGHT: 66 IN | HEART RATE: 83 BPM | SYSTOLIC BLOOD PRESSURE: 138 MMHG | DIASTOLIC BLOOD PRESSURE: 70 MMHG | OXYGEN SATURATION: 99 % | TEMPERATURE: 98.1 F

## 2020-01-16 DIAGNOSIS — I10 HYPERTENSION, ESSENTIAL: Primary | ICD-10-CM

## 2020-01-16 DIAGNOSIS — E78.5 HYPERLIPIDEMIA, UNSPECIFIED HYPERLIPIDEMIA TYPE: ICD-10-CM

## 2020-01-16 LAB
ALBUMIN SERPL-MCNC: 4.6 G/DL (ref 3.5–5.2)
ALBUMIN/GLOB SERPL: 1.6 G/DL
ALP SERPL-CCNC: 49 U/L (ref 39–117)
ALT SERPL W P-5'-P-CCNC: 42 U/L (ref 1–41)
ANION GAP SERPL CALCULATED.3IONS-SCNC: 8.6 MMOL/L (ref 5–15)
AST SERPL-CCNC: 25 U/L (ref 1–40)
BILIRUB SERPL-MCNC: 0.4 MG/DL (ref 0.2–1.2)
BUN BLD-MCNC: 10 MG/DL (ref 6–20)
BUN/CREAT SERPL: 10.1 (ref 7–25)
CALCIUM SPEC-SCNC: 9.7 MG/DL (ref 8.6–10.5)
CHLORIDE SERPL-SCNC: 102 MMOL/L (ref 98–107)
CHOLEST SERPL-MCNC: 127 MG/DL (ref 0–200)
CO2 SERPL-SCNC: 26.4 MMOL/L (ref 22–29)
CREAT BLD-MCNC: 0.99 MG/DL (ref 0.76–1.27)
GFR SERPL CREATININE-BSD FRML MDRD: 88 ML/MIN/1.73
GLOBULIN UR ELPH-MCNC: 2.9 GM/DL
GLUCOSE BLD-MCNC: 89 MG/DL (ref 65–99)
HDLC SERPL-MCNC: 29 MG/DL (ref 40–60)
LDLC SERPL CALC-MCNC: 77 MG/DL (ref 0–100)
LDLC/HDLC SERPL: 2.65 {RATIO}
POTASSIUM BLD-SCNC: 4.7 MMOL/L (ref 3.5–5.2)
PROT SERPL-MCNC: 7.5 G/DL (ref 6–8.5)
SODIUM BLD-SCNC: 137 MMOL/L (ref 136–145)
TRIGL SERPL-MCNC: 106 MG/DL (ref 0–150)
VLDLC SERPL-MCNC: 21.2 MG/DL (ref 5–40)

## 2020-01-16 PROCEDURE — 99213 OFFICE O/P EST LOW 20 MIN: CPT | Performed by: INTERNAL MEDICINE

## 2020-01-16 PROCEDURE — 80053 COMPREHEN METABOLIC PANEL: CPT | Performed by: INTERNAL MEDICINE

## 2020-01-16 PROCEDURE — 80061 LIPID PANEL: CPT | Performed by: INTERNAL MEDICINE

## 2020-01-16 PROCEDURE — 36415 COLL VENOUS BLD VENIPUNCTURE: CPT | Performed by: INTERNAL MEDICINE

## 2020-01-16 NOTE — PROGRESS NOTES
Subjective   Rajat Anguiano is a 34 y.o. male.   There is no height or weight on file to calculate BMI.  Follow-up on blood pressure and lipids  History of Present Illness   Patient is getting ready to start a new exercise campaign help with overall weight blood pressure satisfactory today cannot tolerate by history weight training aerobic or weight training type procedure they can handle there is no exertional chest pain no shortness of breath complaints he is lipids rechecked as well overall is doing fairly well do not have anything on physical them daily indicates he is not reasonable candidate to dissipate the gym and/or conditioning exercises.  She does have some intermittent mild lower back discomfort so we will need to build up gradually but has not been giving her trouble recently    Review of Systems   Musculoskeletal: Positive for back pain.   All other systems reviewed and are negative.      Objective   There were no vitals filed for this visit.      Physical Exam   Constitutional: He appears well-developed and well-nourished.   HENT:   Head: Normocephalic and atraumatic.   Eyes: Pupils are equal, round, and reactive to light. Conjunctivae are normal.   Cardiovascular: Normal rate, regular rhythm and normal heart sounds.   Pulmonary/Chest: Effort normal and breath sounds normal.   Abdominal: Soft. Bowel sounds are normal.   Neurological: He is alert.   Unremarkable gait and station   Skin: Skin is warm and dry.   Nursing note and vitals reviewed.      No results found for: INR    Procedures    Assessment/Plan   1..  Hypertension controlled plan recheck in 6-12 months    2.  Hyperlipidemia get pending lab further recommendations follow follow-up as needed in 1 years time    Much of this encounter note is an electronic transcription/translation of spoken language to printed text.  The electronic translation of spoken language may permit erroneous, or at times, nonsensical words or phrases to be  inadvertently transcribed.  Although I have reviewed the note for such errors, some may still exist. If there are questions or for further clarification, please contact me.    Note stating it is okay to participate in fi fitness type exercises/gym participation

## 2020-01-17 DIAGNOSIS — R79.89 ELEVATED LFTS: Primary | ICD-10-CM

## 2020-04-21 RX ORDER — HYDROXYZINE HYDROCHLORIDE 25 MG/1
TABLET, FILM COATED ORAL
Qty: 30 TABLET | Refills: 0 | Status: SHIPPED | OUTPATIENT
Start: 2020-04-21 | End: 2020-05-21

## 2020-04-21 RX ORDER — LISINOPRIL 10 MG/1
10 TABLET ORAL DAILY
Qty: 30 TABLET | Refills: 0 | Status: SHIPPED | OUTPATIENT
Start: 2020-04-21 | End: 2020-05-21

## 2020-05-21 RX ORDER — HYDROXYZINE HYDROCHLORIDE 25 MG/1
TABLET, FILM COATED ORAL
Qty: 30 TABLET | Refills: 5 | Status: SHIPPED | OUTPATIENT
Start: 2020-05-21 | End: 2020-11-17

## 2020-05-21 RX ORDER — LISINOPRIL 10 MG/1
10 TABLET ORAL DAILY
Qty: 30 TABLET | Refills: 5 | Status: SHIPPED | OUTPATIENT
Start: 2020-05-21 | End: 2020-11-17

## 2020-08-21 ENCOUNTER — OFFICE VISIT (OUTPATIENT)
Dept: FAMILY MEDICINE CLINIC | Facility: CLINIC | Age: 35
End: 2020-08-21

## 2020-08-21 VITALS
SYSTOLIC BLOOD PRESSURE: 100 MMHG | TEMPERATURE: 98 F | DIASTOLIC BLOOD PRESSURE: 70 MMHG | HEIGHT: 66 IN | HEART RATE: 97 BPM | WEIGHT: 249 LBS | OXYGEN SATURATION: 98 % | BODY MASS INDEX: 40.02 KG/M2

## 2020-08-21 DIAGNOSIS — L30.9 DERMATITIS: Primary | ICD-10-CM

## 2020-08-21 DIAGNOSIS — I10 HYPERTENSION, ESSENTIAL: ICD-10-CM

## 2020-08-21 PROCEDURE — 99213 OFFICE O/P EST LOW 20 MIN: CPT | Performed by: INTERNAL MEDICINE

## 2020-08-21 RX ORDER — TRIAMCINOLONE ACETONIDE 1 MG/G
CREAM TOPICAL 2 TIMES DAILY
Qty: 45 G | Refills: 1 | Status: SHIPPED | OUTPATIENT
Start: 2020-08-21 | End: 2021-08-20 | Stop reason: SDUPTHER

## 2020-08-21 NOTE — PROGRESS NOTES
Subjective   Rajat Anguiano is a 35 y.o. male.  Rash on hands also right eyelid and right ear for several Weeks time itching  Body mass index is 40.19 kg/m².  History of Present Illness   Rash on hands for several weeks time.  Also right upper eyelid and right ear canal distally.  Is not aware of any specific chemicals there is no hives no shortness of breath or extensive chemical exposure does not wear gloves a lot is working from home so need for that he is not having rash elsewhere no shortness of breath or other systemic type problems no recent new medication changes as well rash on hands suggest this could be some sort of a contact dermatitis there is done in the left ear her left eye which suggest she is just spreading contact spreading from hand elsewhere.  Overall doing well at other regards blood pressure is good today.    Review of Systems   Constitutional: Negative for fatigue and fever.   HENT: Negative for congestion, rhinorrhea and sore throat.    Eyes: Negative for pain.   Respiratory: Negative for cough and shortness of breath.    Gastrointestinal: Negative for diarrhea and vomiting.   Skin: Positive for rash.   All other systems reviewed and are negative.      Objective   Vitals:    08/21/20 1244   BP: 100/70   Pulse: 97   Temp: 98 °F (36.7 °C)   SpO2: 98%   Weight: 113 kg (249 lb)     Physical Exam   Constitutional: He appears well-developed and well-nourished.   HENT:   Head: Normocephalic and atraumatic.   Eyes: Pupils are equal, round, and reactive to light. Conjunctivae are normal.   Cardiovascular: Normal rate, regular rhythm and normal heart sounds.   Pulmonary/Chest: Effort normal and breath sounds normal.   Skin: Skin is warm and dry.   Some intermittent areas of red rash more macular and confluent also between the between the fingers dorsum of the right wrist also.  Right hand is more problematic than the left.  Palms are noninfected distortion of the hand and webspaces the eyelid is very  faint is upper eyelid only left eye lids are fine the right ear mild irritation just the very distal ear canal and at the entrance thereof no blisters or drainage left ear looks good as well.   Nursing note and vitals reviewed.      No results found for: INR    Procedures    Assessment/Plan     Contact dermatitis of the hands right eyelid and right ear plan avoid the eye but triamcinolone to affected areas otherwise twice daily triamcinolone 0.1% cream if not resolved in 1 week's time patient needs see dermatologist list dermatologist given to patient as well.    Much of this encounter note is an electronic transcription/translation of spoken language to printed text.  The electronic translation of spoken language may permit erroneous, or at times, nonsensical words or phrases to be inadvertently transcribed.  Although I have reviewed the note for such errors, some may still exist. If there are questions or for further clarification, please contact me.

## 2020-11-17 RX ORDER — HYDROXYZINE HYDROCHLORIDE 25 MG/1
TABLET, FILM COATED ORAL
Qty: 30 TABLET | Refills: 5 | Status: SHIPPED | OUTPATIENT
Start: 2020-11-17 | End: 2022-11-11

## 2020-11-17 RX ORDER — LISINOPRIL 10 MG/1
10 TABLET ORAL DAILY
Qty: 30 TABLET | Refills: 5 | Status: SHIPPED | OUTPATIENT
Start: 2020-11-17 | End: 2021-08-20

## 2021-04-16 ENCOUNTER — BULK ORDERING (OUTPATIENT)
Dept: CASE MANAGEMENT | Facility: OTHER | Age: 36
End: 2021-04-16

## 2021-04-16 DIAGNOSIS — Z23 IMMUNIZATION DUE: ICD-10-CM

## 2021-08-20 ENCOUNTER — OFFICE VISIT (OUTPATIENT)
Dept: INTERNAL MEDICINE | Facility: CLINIC | Age: 36
End: 2021-08-20

## 2021-08-20 ENCOUNTER — LAB (OUTPATIENT)
Dept: LAB | Facility: HOSPITAL | Age: 36
End: 2021-08-20

## 2021-08-20 VITALS
SYSTOLIC BLOOD PRESSURE: 119 MMHG | BODY MASS INDEX: 39.53 KG/M2 | DIASTOLIC BLOOD PRESSURE: 81 MMHG | OXYGEN SATURATION: 97 % | TEMPERATURE: 98 F | HEART RATE: 92 BPM | HEIGHT: 66 IN | RESPIRATION RATE: 16 BRPM | WEIGHT: 246 LBS

## 2021-08-20 DIAGNOSIS — Z00.00 HEALTHCARE MAINTENANCE: Primary | ICD-10-CM

## 2021-08-20 DIAGNOSIS — Z13.220 ENCOUNTER FOR SCREENING FOR LIPID DISORDER: ICD-10-CM

## 2021-08-20 DIAGNOSIS — Z13.89 ENCOUNTER FOR SCREENING FOR OTHER DISORDER: ICD-10-CM

## 2021-08-20 DIAGNOSIS — E66.9 OBESITY (BMI 30-39.9): ICD-10-CM

## 2021-08-20 DIAGNOSIS — I10 ESSENTIAL HYPERTENSION: ICD-10-CM

## 2021-08-20 DIAGNOSIS — Z13.228 ENCOUNTER FOR SCREENING FOR METABOLIC DISORDER: ICD-10-CM

## 2021-08-20 LAB
25(OH)D3 SERPL-MCNC: 32.9 NG/ML (ref 30–100)
ALBUMIN SERPL-MCNC: 4.3 G/DL (ref 3.5–5.2)
ALBUMIN/GLOB SERPL: 1.7 G/DL
ALP SERPL-CCNC: 55 U/L (ref 39–117)
ALT SERPL W P-5'-P-CCNC: 45 U/L (ref 1–41)
ANION GAP SERPL CALCULATED.3IONS-SCNC: 8.7 MMOL/L (ref 5–15)
AST SERPL-CCNC: 28 U/L (ref 1–40)
BILIRUB SERPL-MCNC: 0.4 MG/DL (ref 0–1.2)
BILIRUB UR QL STRIP: NEGATIVE
BUN SERPL-MCNC: 13 MG/DL (ref 6–20)
BUN/CREAT SERPL: 13.4 (ref 7–25)
CALCIUM SPEC-SCNC: 9.5 MG/DL (ref 8.6–10.5)
CHLORIDE SERPL-SCNC: 104 MMOL/L (ref 98–107)
CHOLEST SERPL-MCNC: 162 MG/DL (ref 0–200)
CLARITY UR: CLEAR
CO2 SERPL-SCNC: 25.3 MMOL/L (ref 22–29)
COLOR UR: ABNORMAL
CREAT SERPL-MCNC: 0.97 MG/DL (ref 0.76–1.27)
DEPRECATED RDW RBC AUTO: 39.2 FL (ref 37–54)
ERYTHROCYTE [DISTWIDTH] IN BLOOD BY AUTOMATED COUNT: 12.7 % (ref 12.3–15.4)
FOLATE SERPL-MCNC: 17.6 NG/ML (ref 4.78–24.2)
GFR SERPL CREATININE-BSD FRML MDRD: 89 ML/MIN/1.73
GLOBULIN UR ELPH-MCNC: 2.5 GM/DL
GLUCOSE SERPL-MCNC: 87 MG/DL (ref 65–99)
GLUCOSE UR STRIP-MCNC: NEGATIVE MG/DL
HBA1C MFR BLD: 5.1 % (ref 4.8–5.6)
HCT VFR BLD AUTO: 44.8 % (ref 37.5–51)
HCV AB SER DONR QL: NORMAL
HDLC SERPL QL: 4.63
HDLC SERPL-MCNC: 35 MG/DL (ref 40–60)
HGB BLD-MCNC: 15.2 G/DL (ref 13–17.7)
HGB UR QL STRIP.AUTO: NEGATIVE
KETONES UR QL STRIP: NEGATIVE
LDLC SERPL CALC-MCNC: 91 MG/DL (ref 0–100)
LEUKOCYTE ESTERASE UR QL STRIP.AUTO: NEGATIVE
MCH RBC QN AUTO: 29.2 PG (ref 26.6–33)
MCHC RBC AUTO-ENTMCNC: 33.9 G/DL (ref 31.5–35.7)
MCV RBC AUTO: 86.2 FL (ref 79–97)
NITRITE UR QL STRIP: NEGATIVE
PH UR STRIP.AUTO: 6.5 [PH] (ref 5–8)
PLATELET # BLD AUTO: 213 10*3/MM3 (ref 140–450)
PMV BLD AUTO: 10.7 FL (ref 6–12)
POTASSIUM SERPL-SCNC: 4.3 MMOL/L (ref 3.5–5.2)
PROT SERPL-MCNC: 6.8 G/DL (ref 6–8.5)
PROT UR QL STRIP: NEGATIVE
RBC # BLD AUTO: 5.2 10*6/MM3 (ref 4.14–5.8)
SODIUM SERPL-SCNC: 138 MMOL/L (ref 136–145)
SP GR UR STRIP: 1.02 (ref 1–1.03)
TRIGL SERPL-MCNC: 214 MG/DL (ref 0–150)
TSH SERPL DL<=0.05 MIU/L-ACNC: 2.27 UIU/ML (ref 0.27–4.2)
UROBILINOGEN UR QL STRIP: ABNORMAL
VIT B12 BLD-MCNC: 556 PG/ML (ref 211–946)
VLDLC SERPL-MCNC: 36 MG/DL (ref 5–40)
WBC # BLD AUTO: 8.41 10*3/MM3 (ref 3.4–10.8)

## 2021-08-20 PROCEDURE — 85027 COMPLETE CBC AUTOMATED: CPT | Performed by: FAMILY MEDICINE

## 2021-08-20 PROCEDURE — 81003 URINALYSIS AUTO W/O SCOPE: CPT | Performed by: FAMILY MEDICINE

## 2021-08-20 PROCEDURE — 84443 ASSAY THYROID STIM HORMONE: CPT | Performed by: FAMILY MEDICINE

## 2021-08-20 PROCEDURE — 80053 COMPREHEN METABOLIC PANEL: CPT | Performed by: FAMILY MEDICINE

## 2021-08-20 PROCEDURE — 80061 LIPID PANEL: CPT | Performed by: FAMILY MEDICINE

## 2021-08-20 PROCEDURE — 86803 HEPATITIS C AB TEST: CPT | Performed by: FAMILY MEDICINE

## 2021-08-20 PROCEDURE — 82607 VITAMIN B-12: CPT | Performed by: FAMILY MEDICINE

## 2021-08-20 PROCEDURE — 99395 PREV VISIT EST AGE 18-39: CPT | Performed by: FAMILY MEDICINE

## 2021-08-20 PROCEDURE — 82306 VITAMIN D 25 HYDROXY: CPT | Performed by: FAMILY MEDICINE

## 2021-08-20 PROCEDURE — 83036 HEMOGLOBIN GLYCOSYLATED A1C: CPT | Performed by: FAMILY MEDICINE

## 2021-08-20 PROCEDURE — 82746 ASSAY OF FOLIC ACID SERUM: CPT | Performed by: FAMILY MEDICINE

## 2021-08-20 PROCEDURE — 36415 COLL VENOUS BLD VENIPUNCTURE: CPT | Performed by: FAMILY MEDICINE

## 2021-08-20 RX ORDER — TRIAMCINOLONE ACETONIDE 1 MG/G
CREAM TOPICAL 2 TIMES DAILY
Qty: 45 G | Refills: 4 | Status: SHIPPED | OUTPATIENT
Start: 2021-08-20

## 2021-08-27 ENCOUNTER — OFFICE VISIT (OUTPATIENT)
Dept: INTERNAL MEDICINE | Facility: CLINIC | Age: 36
End: 2021-08-27

## 2021-08-27 VITALS
RESPIRATION RATE: 16 BRPM | DIASTOLIC BLOOD PRESSURE: 82 MMHG | OXYGEN SATURATION: 97 % | SYSTOLIC BLOOD PRESSURE: 133 MMHG | TEMPERATURE: 98 F | BODY MASS INDEX: 39.47 KG/M2 | HEIGHT: 66 IN | HEART RATE: 79 BPM | WEIGHT: 245.6 LBS

## 2021-08-27 DIAGNOSIS — I10 ESSENTIAL HYPERTENSION: Primary | ICD-10-CM

## 2021-08-27 PROCEDURE — 99213 OFFICE O/P EST LOW 20 MIN: CPT | Performed by: FAMILY MEDICINE

## 2021-08-27 NOTE — PROGRESS NOTES
08/27/2021    Patient Information  Rajat Anguiano                                                                                          [address]  [facility] [facility] [place] [address]      Chief Complaint:   Chief Complaint   Patient presents with   • Follow-up     1 week           History of Present Illness:  Pt is here for bp check. No symptoms to report since stopping lisinopril. Does not have a home bp cuff. Endorses he has started exercising. Decreasing sodium content in diet. No further concerns/complaints.    Review of Systems   Constitutional: Negative.   HENT: Negative.    Eyes: Negative.    Cardiovascular: Negative.    Respiratory: Negative.    Endocrine: Negative.    Hematologic/Lymphatic: Negative.    Musculoskeletal: Negative.    Gastrointestinal: Negative.    Genitourinary: Negative.    Neurological: Negative.    Psychiatric/Behavioral: Negative.    Allergic/Immunologic: Negative.        Active Problems:    Patient Active Problem List   Diagnosis   • Essential hypertension   • Anxiety         Past Medical History:   Diagnosis Date   • Anxiety    • Asthma    • Depression    • Environmental allergies    • Fatigue    • Hypertension    • Sleep apnea          History reviewed. No pertinent surgical history.      No Known Allergies        Current Outpatient Medications:   •  hydrOXYzine (ATARAX) 25 MG tablet, TAKE 1 TABLET BY MOUTH EVERY NIGHT AT BEDTIME, Disp: 30 tablet, Rfl: 5  •  triamcinolone (KENALOG) 0.1 % cream, Apply  topically to the appropriate area as directed 2 (Two) Times a Day., Disp: 45 g, Rfl: 4      Family History   Problem Relation Age of Onset   • Depression Other    • Hypertension Other    • Depression Mother    • Hypertension Father    • No Known Problems Maternal Grandmother    • No Known Problems Maternal Grandfather    • Dementia Paternal Grandmother    • Heart disease Paternal Grandfather          Social History     Socioeconomic History   • Marital status:  "Single     Spouse name: Not on file   • Number of children: Not on file   • Years of education: Not on file   • Highest education level: Not on file   Tobacco Use   • Smoking status: Never Smoker   • Smokeless tobacco: Never Used   Vaping Use   • Vaping Use: Never used   Substance and Sexual Activity   • Alcohol use: No     Comment: Once in 2 years   • Drug use: Defer   • Sexual activity: Defer         Physical Exam  Constitutional:       Appearance: Normal appearance. He is obese.   Cardiovascular:      Rate and Rhythm: Normal rate and regular rhythm.      Pulses: Normal pulses.      Heart sounds: Normal heart sounds.   Pulmonary:      Effort: Pulmonary effort is normal.      Breath sounds: Normal breath sounds.   Skin:     General: Skin is warm.      Findings: No rash.   Neurological:      General: No focal deficit present.      Mental Status: He is alert and oriented to person, place, and time. Mental status is at baseline.   Psychiatric:         Mood and Affect: Mood normal.         Behavior: Behavior normal.         Thought Content: Thought content normal.         Judgment: Judgment normal.          Vitals:    08/27/21 0759   BP: 133/82   Pulse: 79   Resp: 16   Temp: 98 °F (36.7 °C)   SpO2: 97%   Weight: 111 kg (245 lb 9.6 oz)   Height: 167.6 cm (66\")       Body mass index is 39.64 kg/m².       Lab/other results:        Assessment/Plan:    Diagnoses and all orders for this visit:    1. Essential hypertension (Primary)       Well controlled today. strongly encouraged weight loss, exercise, low sodium/low fat diet. F/u again in 1 month for repeat bp check. Advised home bp monitoring.        Procedures          "

## 2021-10-01 ENCOUNTER — OFFICE VISIT (OUTPATIENT)
Dept: INTERNAL MEDICINE | Facility: CLINIC | Age: 36
End: 2021-10-01

## 2021-10-01 VITALS
HEIGHT: 66 IN | SYSTOLIC BLOOD PRESSURE: 130 MMHG | OXYGEN SATURATION: 98 % | RESPIRATION RATE: 16 BRPM | WEIGHT: 243.6 LBS | HEART RATE: 73 BPM | DIASTOLIC BLOOD PRESSURE: 72 MMHG | BODY MASS INDEX: 39.15 KG/M2

## 2021-10-01 DIAGNOSIS — I10 ESSENTIAL HYPERTENSION: Primary | ICD-10-CM

## 2021-10-01 DIAGNOSIS — E66.9 OBESITY (BMI 30-39.9): ICD-10-CM

## 2021-10-01 DIAGNOSIS — M54.32 LEFT SCIATIC NERVE PAIN: ICD-10-CM

## 2021-10-01 PROCEDURE — 99214 OFFICE O/P EST MOD 30 MIN: CPT | Performed by: FAMILY MEDICINE

## 2021-10-01 RX ORDER — PREDNISONE 50 MG/1
50 TABLET ORAL DAILY
Qty: 10 TABLET | Refills: 0 | Status: SHIPPED | OUTPATIENT
Start: 2021-10-01 | End: 2022-05-02

## 2021-10-01 RX ORDER — TIZANIDINE 2 MG/1
2 TABLET ORAL NIGHTLY PRN
Qty: 10 TABLET | Refills: 1 | Status: SHIPPED | OUTPATIENT
Start: 2021-10-01 | End: 2022-05-02

## 2021-10-01 RX ORDER — IBUPROFEN 800 MG/1
800 TABLET ORAL EVERY 6 HOURS PRN
Qty: 20 TABLET | Refills: 1 | Status: SHIPPED | OUTPATIENT
Start: 2021-10-01 | End: 2022-05-02

## 2021-10-01 NOTE — PROGRESS NOTES
10/01/2021    Patient Information  Rajat Anguiano                                                                                          [address]  [facility] [facility] [address]      Chief Complaint:   Chief Complaint   Patient presents with   • Follow up to hypertension     Pain in left leg           History of Present Illness:  Patient is here for left sciatica.  Onset was approximately 1 to 2 weeks ago.  He denies any trauma no falls.  He has not treated the pain with medicine.  Symptoms have been intermittent.  No other associated symptoms.  No further concerns nor complaints.    Review of Systems   Constitutional: Negative.   HENT: Negative.    Eyes: Negative.    Cardiovascular: Negative.    Respiratory: Negative.    Endocrine: Negative.    Hematologic/Lymphatic: Negative.    Skin: Negative.    Musculoskeletal: Negative.    Gastrointestinal: Negative.    Genitourinary: Negative.    Neurological: Positive for paresthesias.   Psychiatric/Behavioral: Negative.    Allergic/Immunologic: Negative.        Active Problems:    Patient Active Problem List   Diagnosis   • Essential hypertension   • Anxiety   • Obesity (BMI 30-39.9)         Past Medical History:   Diagnosis Date   • Anxiety    • Asthma    • Depression    • Environmental allergies    • Fatigue    • Hypertension    • Sleep apnea          History reviewed. No pertinent surgical history.      No Known Allergies        Current Outpatient Medications:   •  hydrOXYzine (ATARAX) 25 MG tablet, TAKE 1 TABLET BY MOUTH EVERY NIGHT AT BEDTIME, Disp: 30 tablet, Rfl: 5  •  triamcinolone (KENALOG) 0.1 % cream, Apply  topically to the appropriate area as directed 2 (Two) Times a Day., Disp: 45 g, Rfl: 4  •  ibuprofen (ADVIL,MOTRIN) 800 MG tablet, Take 1 tablet by mouth Every 6 (Six) Hours As Needed for Mild Pain  or Moderate Pain ., Disp: 20 tablet, Rfl: 1  •  predniSONE (DELTASONE) 50 MG tablet, Take 1 tablet by mouth Daily., Disp: 10 tablet, Rfl:  "0  •  tiZANidine (ZANAFLEX) 2 MG tablet, Take 1 tablet by mouth At Night As Needed for Muscle Spasms., Disp: 10 tablet, Rfl: 1      Family History   Problem Relation Age of Onset   • Depression Other    • Hypertension Other    • Depression Mother    • Hypertension Father    • No Known Problems Maternal Grandmother    • No Known Problems Maternal Grandfather    • Dementia Paternal Grandmother    • Heart disease Paternal Grandfather          Social History     Socioeconomic History   • Marital status: Single     Spouse name: Not on file   • Number of children: Not on file   • Years of education: Not on file   • Highest education level: Not on file   Tobacco Use   • Smoking status: Never Smoker   • Smokeless tobacco: Never Used   Vaping Use   • Vaping Use: Never used   Substance and Sexual Activity   • Alcohol use: No     Comment: Once in 2 years   • Drug use: Defer   • Sexual activity: Defer         Physical Exam  Constitutional:       Appearance: Normal appearance. He is obese.   Cardiovascular:      Rate and Rhythm: Normal rate and regular rhythm.      Pulses: Normal pulses.      Heart sounds: Normal heart sounds.   Pulmonary:      Effort: Pulmonary effort is normal.      Breath sounds: Normal breath sounds.   Skin:     General: Skin is warm.      Findings: No rash.   Neurological:      General: No focal deficit present.      Mental Status: He is alert and oriented to person, place, and time. Mental status is at baseline.   Psychiatric:         Mood and Affect: Mood normal.         Behavior: Behavior normal.         Thought Content: Thought content normal.         Judgment: Judgment normal.          Vitals:    10/01/21 1426 10/01/21 1734   BP: 144/80 130/72   Pulse: 73    Resp: 16    SpO2: 98%    Weight: 110 kg (243 lb 9.6 oz)    Height: 167.6 cm (66\")        Body mass index is 39.32 kg/m².       Lab/other results:        Assessment/Plan:    Diagnoses and all orders for this visit:    1. Essential hypertension " (Primary)    2. Obesity (BMI 30-39.9)    3. Left sciatic nerve pain    Other orders  -     tiZANidine (ZANAFLEX) 2 MG tablet; Take 1 tablet by mouth At Night As Needed for Muscle Spasms.  Dispense: 10 tablet; Refill: 1  -     predniSONE (DELTASONE) 50 MG tablet; Take 1 tablet by mouth Daily.  Dispense: 10 tablet; Refill: 0  -     ibuprofen (ADVIL,MOTRIN) 800 MG tablet; Take 1 tablet by mouth Every 6 (Six) Hours As Needed for Mild Pain  or Moderate Pain .  Dispense: 20 tablet; Refill: 1  -     Tdap Vaccine Greater Than or Equal To 6yo IM       Obesity--counseled for healthy diet /exercise.  Monitor weights.    Hypertension--lisinopril was previously stopped since patient has lost weight since being diagnosed with hypertension. Advised patient to lose weight.  Optimize Bmi to 25. well-controlled today.    Left sciatica--prednisone burst plus tizanidine.  Low back strengthening exercises.  Follow-up in 10 days if no improvement.    Procedures

## 2022-05-02 ENCOUNTER — OFFICE VISIT (OUTPATIENT)
Dept: INTERNAL MEDICINE | Facility: CLINIC | Age: 37
End: 2022-05-02

## 2022-05-02 ENCOUNTER — LAB (OUTPATIENT)
Dept: LAB | Facility: HOSPITAL | Age: 37
End: 2022-05-02

## 2022-05-02 VITALS
SYSTOLIC BLOOD PRESSURE: 130 MMHG | OXYGEN SATURATION: 96 % | HEART RATE: 81 BPM | HEIGHT: 66 IN | BODY MASS INDEX: 39.82 KG/M2 | WEIGHT: 247.8 LBS | DIASTOLIC BLOOD PRESSURE: 70 MMHG

## 2022-05-02 DIAGNOSIS — F41.9 ANXIETY: ICD-10-CM

## 2022-05-02 DIAGNOSIS — E66.01 CLASS 3 SEVERE OBESITY DUE TO EXCESS CALORIES WITH SERIOUS COMORBIDITY AND BODY MASS INDEX (BMI) OF 40.0 TO 44.9 IN ADULT: Primary | ICD-10-CM

## 2022-05-02 DIAGNOSIS — R58 BLEEDING: ICD-10-CM

## 2022-05-02 DIAGNOSIS — G47.33 OBSTRUCTIVE SLEEP APNEA SYNDROME: ICD-10-CM

## 2022-05-02 PROBLEM — E66.813 CLASS 3 SEVERE OBESITY DUE TO EXCESS CALORIES WITH SERIOUS COMORBIDITY AND BODY MASS INDEX (BMI) OF 40.0 TO 44.9 IN ADULT: Status: ACTIVE | Noted: 2021-10-01

## 2022-05-02 LAB
BASOPHILS # BLD AUTO: 0.06 10*3/MM3 (ref 0–0.2)
BASOPHILS NFR BLD AUTO: 0.7 % (ref 0–1.5)
DEPRECATED RDW RBC AUTO: 39.8 FL (ref 37–54)
EOSINOPHIL # BLD AUTO: 0.06 10*3/MM3 (ref 0–0.4)
EOSINOPHIL NFR BLD AUTO: 0.7 % (ref 0.3–6.2)
ERYTHROCYTE [DISTWIDTH] IN BLOOD BY AUTOMATED COUNT: 13 % (ref 12.3–15.4)
HCT VFR BLD AUTO: 47.1 % (ref 37.5–51)
HGB BLD-MCNC: 16.2 G/DL (ref 13–17.7)
IMM GRANULOCYTES # BLD AUTO: 0.12 10*3/MM3 (ref 0–0.05)
IMM GRANULOCYTES NFR BLD AUTO: 1.5 % (ref 0–0.5)
LYMPHOCYTES # BLD AUTO: 2.36 10*3/MM3 (ref 0.7–3.1)
LYMPHOCYTES NFR BLD AUTO: 29 % (ref 19.6–45.3)
MCH RBC QN AUTO: 29.5 PG (ref 26.6–33)
MCHC RBC AUTO-ENTMCNC: 34.4 G/DL (ref 31.5–35.7)
MCV RBC AUTO: 85.6 FL (ref 79–97)
MONOCYTES # BLD AUTO: 0.96 10*3/MM3 (ref 0.1–0.9)
MONOCYTES NFR BLD AUTO: 11.8 % (ref 5–12)
NEUTROPHILS NFR BLD AUTO: 4.59 10*3/MM3 (ref 1.7–7)
NEUTROPHILS NFR BLD AUTO: 56.3 % (ref 42.7–76)
NRBC BLD AUTO-RTO: 0 /100 WBC (ref 0–0.2)
PLATELET # BLD AUTO: 221 10*3/MM3 (ref 140–450)
PMV BLD AUTO: 10.5 FL (ref 6–12)
RBC # BLD AUTO: 5.5 10*6/MM3 (ref 4.14–5.8)
WBC NRBC COR # BLD: 8.15 10*3/MM3 (ref 3.4–10.8)

## 2022-05-02 PROCEDURE — 36415 COLL VENOUS BLD VENIPUNCTURE: CPT | Performed by: FAMILY MEDICINE

## 2022-05-02 PROCEDURE — 99214 OFFICE O/P EST MOD 30 MIN: CPT | Performed by: FAMILY MEDICINE

## 2022-05-02 PROCEDURE — 85025 COMPLETE CBC W/AUTO DIFF WBC: CPT | Performed by: FAMILY MEDICINE

## 2022-05-02 RX ORDER — CITALOPRAM 10 MG/1
10 TABLET ORAL DAILY
Qty: 30 TABLET | Refills: 1 | Status: SHIPPED | OUTPATIENT
Start: 2022-05-02 | End: 2022-07-05

## 2022-05-02 NOTE — PROGRESS NOTES
"Chief Complaint  transferring from Dr. Tobin, Anxiety, and headaches    Subjective          Rajat Anguiano presents to [facility]  History of Present Illness  Patient follow-up appointment for ongoing management of chronic problems of headaches obesity anxiety depression sleep apnea  Has some frontal muscle tension type headaches was concerned because over the last couple weeks he has had 3 episodes of bleeding posterior ear on the right side that he noticed while he was taking a shower but could not find the obvious source of the bleeding he has had some left-sided headaches as well but no other specific findings or neurologic signs  He has CPAP for sleep apnea but does not use it regularly because of poor fitting nasal apparatus  He also has some worsening of his anxiety he stopped taking hydroxyzine a couple months ago he has had depression when he was a teenager    Objective   Vital Signs:   /70 (BP Location: Left arm, Patient Position: Sitting, Cuff Size: Large Adult)   Pulse 81   Ht 167.6 cm (66\")   Wt 112 kg (247 lb 12.8 oz)   SpO2 96%   BMI 40.00 kg/m²     Physical Exam  Vitals and nursing note reviewed.   Constitutional:       Appearance: Normal appearance. He is obese.   HENT:      Right Ear: Tympanic membrane, ear canal and external ear normal. There is no impacted cerumen.      Left Ear: Tympanic membrane, ear canal and external ear normal. There is no impacted cerumen.      Nose: Nose normal.      Mouth/Throat:      Mouth: Mucous membranes are dry.      Comments: Mallampati class IV  Eyes:      General: No scleral icterus.  Cardiovascular:      Rate and Rhythm: Normal rate and regular rhythm.      Pulses: Normal pulses.      Heart sounds: Normal heart sounds.   Neurological:      Mental Status: He is alert.   Psychiatric:         Mood and Affect: Mood normal.         Behavior: Behavior normal.         Thought Content: Thought content normal.         Judgment: " Judgment normal.        Result Review :     Common labs    Common Labsle 8/20/21 8/20/21 8/20/21 8/20/21    1510 1510 1510 1510   Glucose   87    BUN   13    Creatinine   0.97    eGFR Non African Am   89    Sodium   138    Potassium   4.3    Chloride   104    Calcium   9.5    Albumin   4.30    Total Bilirubin   0.4    Alkaline Phosphatase   55    AST (SGOT)   28    ALT (SGPT)   45 (A)    WBC 8.41      Hemoglobin 15.2      Hematocrit 44.8      Platelets 213      Total Cholesterol  162     Triglycerides  214 (A)     HDL Cholesterol  35 (A)     LDL Cholesterol   91     Hemoglobin A1C    5.10   (A) Abnormal value                      Assessment and Plan    Diagnoses and all orders for this visit:    1. Class 3 severe obesity due to excess calories with serious comorbidity and body mass index (BMI) of 40.0 to 44.9 in adult (HCC) (Primary)    2. Anxiety    3. Bleeding  -     CBC & Differential    4. Obstructive sleep apnea syndrome    Other orders  -     citalopram (CeleXA) 10 MG tablet; Take 1 tablet by mouth Daily.  Dispense: 30 tablet; Refill: 1        Discontinue hydroxyzine trial of citalopram for anxiety and depression  Recommend follow-up with sleep study to Adjust mask for CPAP  Recommend weight loss calorie restriction diet  Follow-up results of CBC for bleeding episode  Patient will call in 1 month update benefit of initiating citalopram       Follow Up   No follow-ups on file.  Patient was given instructions and counseling regarding his condition or for health maintenance advice. Please see specific information pulled into the AVS if appropriate.

## 2022-07-05 RX ORDER — CITALOPRAM 10 MG/1
10 TABLET ORAL DAILY
Qty: 30 TABLET | Refills: 1 | Status: SHIPPED | OUTPATIENT
Start: 2022-07-05 | End: 2022-08-30

## 2022-08-30 RX ORDER — CITALOPRAM 10 MG/1
10 TABLET ORAL DAILY
Qty: 30 TABLET | Refills: 1 | Status: SHIPPED | OUTPATIENT
Start: 2022-08-30 | End: 2022-10-26

## 2022-10-26 RX ORDER — CITALOPRAM 10 MG/1
10 TABLET ORAL DAILY
Qty: 30 TABLET | Refills: 1 | Status: SHIPPED | OUTPATIENT
Start: 2022-10-26 | End: 2023-01-03

## 2022-11-11 ENCOUNTER — OFFICE VISIT (OUTPATIENT)
Dept: INTERNAL MEDICINE | Facility: CLINIC | Age: 37
End: 2022-11-11

## 2022-11-11 VITALS
OXYGEN SATURATION: 96 % | BODY MASS INDEX: 39.53 KG/M2 | HEART RATE: 80 BPM | SYSTOLIC BLOOD PRESSURE: 120 MMHG | DIASTOLIC BLOOD PRESSURE: 72 MMHG | WEIGHT: 246 LBS | HEIGHT: 66 IN

## 2022-11-11 DIAGNOSIS — R51.9 SCALP PAIN: ICD-10-CM

## 2022-11-11 DIAGNOSIS — Z00.00 HEALTHCARE MAINTENANCE: ICD-10-CM

## 2022-11-11 DIAGNOSIS — G47.33 OBSTRUCTIVE SLEEP APNEA SYNDROME: ICD-10-CM

## 2022-11-11 DIAGNOSIS — F41.9 ANXIETY: Primary | ICD-10-CM

## 2022-11-11 PROBLEM — R14.0 ABDOMINAL BLOATING: Status: ACTIVE | Noted: 2022-11-11

## 2022-11-11 PROCEDURE — 99395 PREV VISIT EST AGE 18-39: CPT | Performed by: FAMILY MEDICINE

## 2022-11-11 PROCEDURE — 99214 OFFICE O/P EST MOD 30 MIN: CPT | Performed by: FAMILY MEDICINE

## 2022-11-11 RX ORDER — METOCLOPRAMIDE 5 MG/1
5 TABLET ORAL
Qty: 90 TABLET | Refills: 0 | Status: SHIPPED | OUTPATIENT
Start: 2022-11-11 | End: 2022-12-08

## 2022-11-11 NOTE — PROGRESS NOTES
"Chief Complaint  sleep apnea, Bloated, dull pain/tenderness back of head, Annual Exam, and follow up to anxiety    Subjective        Rajat Anguiano presents to [facility]  History of Present Illness  Appointment to discuss pain back of his head and bloating  Splinting feeling seems to be developing throughout the day he will have some flatus that seems more frequent than necessary does not  burp a lot does not foul-smelling.  No significant changes in his diet no change in bowel habits  He also has some tenderness on the right parietal occiput area that is fleeting in nature less than 5 minutes every 2 weeks or so no specific radiating quality or other associated signs or symptoms fever sweats or chills or trauma  He also has a history of sleep apnea and is unable to tolerate CPAP he has tried it for quite some time and now has been off it for few months does not have any headache or daytime sleepiness    Objective   Vital Signs:  /72 (BP Location: Left arm, Patient Position: Sitting, Cuff Size: Large Adult)   Pulse 80   Ht 167.6 cm (66\")   Wt 112 kg (246 lb)   SpO2 96%   BMI 39.71 kg/m²   Estimated body mass index is 39.71 kg/m² as calculated from the following:    Height as of this encounter: 167.6 cm (66\").    Weight as of this encounter: 112 kg (246 lb).          Physical Exam  Vitals and nursing note reviewed.   Constitutional:       Appearance: Normal appearance. He is well-developed. He is obese. He is not diaphoretic.   HENT:      Head: Normocephalic and atraumatic.      Right Ear: Tympanic membrane, ear canal and external ear normal.      Left Ear: Tympanic membrane, ear canal and external ear normal.   Eyes:      General: Lids are normal. No scleral icterus.     Extraocular Movements: Extraocular movements intact.      Conjunctiva/sclera: Conjunctivae normal.   Neck:      Thyroid: No thyroid mass or thyromegaly.      Vascular: No carotid bruit or JVD. "   Cardiovascular:      Rate and Rhythm: Normal rate and regular rhythm.      Pulses: Normal pulses.           Radial pulses are 2+ on the right side and 2+ on the left side.      Heart sounds: Normal heart sounds. No murmur heard.  Pulmonary:      Effort: Pulmonary effort is normal. No respiratory distress.      Breath sounds: Normal breath sounds.   Abdominal:      Palpations: Abdomen is soft.   Musculoskeletal:      Cervical back: Normal range of motion.      Right lower leg: No edema.      Left lower leg: No edema.   Skin:     General: Skin is warm and dry.      Coloration: Skin is not pale.      Findings: No erythema or rash.   Neurological:      General: No focal deficit present.      Mental Status: He is alert and oriented to person, place, and time.      Sensory: No sensory deficit.      Deep Tendon Reflexes: Reflexes are normal and symmetric.   Psychiatric:         Mood and Affect: Mood normal.         Behavior: Behavior normal. Behavior is cooperative.         Thought Content: Thought content normal.         Judgment: Judgment normal.        Result Review :    Common labs    Common Labs 5/2/22   WBC 8.15   Hemoglobin 16.2   Hematocrit 47.1   Platelets 221                     Assessment and Plan   Diagnoses and all orders for this visit:    1. Anxiety (Primary)    2. Obstructive sleep apnea syndrome  -     Ambulatory Referral to ENT (Otolaryngology)    3. Healthcare maintenance  -     Lipid Panel; Future    4. Scalp pain    Other orders  -     metoclopramide (Reglan) 5 MG tablet; Take 1 tablet by mouth 3 (Three) Times a Day Before Meals.  Dispense: 90 tablet; Refill: 0    Scalp pain monitor for worsening symptoms  Bloating trial of Reglan  Anxiety continue citalopram  Sleep apnea consult ENT for inspire         Follow Up   Return in about 3 months (around 2/11/2023), or if symptoms worsen or fail to improve, for Recheck.  Patient was given instructions and counseling regarding his condition or for health  maintenance advice. Please see specific information pulled into the AVS if appropriate.

## 2022-11-11 NOTE — PROGRESS NOTES
Subjective   Rajat Anguiano is a 37 y.o. male.     Chief Complaint   Patient presents with   • sleep apnea   • Bloated   • dull pain/tenderness back of head   • Annual Exam   • follow up to anxiety     Health maintenance    History of Present Illness   Rajat Anguiano 37 y.o. male who presents for an Annual Wellness Visit.  he has a history of   Patient Active Problem List   Diagnosis   • Essential hypertension   • Anxiety   • Class 3 severe obesity due to excess calories with serious comorbidity and body mass index (BMI) of 40.0 to 44.9 in adult (HCC)   • Sleep apnea   • Scalp pain   • Abdominal bloating   .  he has been feeling fairly well.   I  reviewed health maintenance with him as part of my preventative care plan.    Recommend regular dental and eye exams  Study: Sleep apnea  • 1/25/19  The patient tolerated the home sleep testing with monitoring time of 455 minutes. The data obtained make this a technically adequate study. The apnea hypopneas index(AHI) was 35.3 per sleep hour.  The AHI during supine position was 41.4 per sleep hour. Mean heart rate of 68.3 BPM.  Snoring was noted 45.4% of sleep time. Lowest oxygen saturation during the study was 80%. Saturation below 89% was noted for 12.7 mins.  The following portions of the patient's history were reviewed and updated as appropriate: allergies, current medications, past family history, past medical history, past social history, past surgical history and problem list.    Review of Systems   Constitutional: Negative.    HENT: Negative.    Eyes: Negative.    Respiratory: Negative.    Cardiovascular: Negative.    Gastrointestinal: Positive for abdominal distention.   Genitourinary: Negative.    Allergic/Immunologic: Negative.    Neurological: Negative.    Psychiatric/Behavioral: The patient is nervous/anxious.        Objective   Physical Exam  Vitals and nursing note reviewed.   Constitutional:       Appearance: Normal appearance. He is well-developed. He is  not diaphoretic.   HENT:      Head: Normocephalic and atraumatic.      Right Ear: Tympanic membrane, ear canal and external ear normal.      Left Ear: Tympanic membrane, ear canal and external ear normal.   Eyes:      General: Lids are normal. No scleral icterus.     Extraocular Movements: Extraocular movements intact.      Conjunctiva/sclera: Conjunctivae normal.   Neck:      Thyroid: No thyroid mass or thyromegaly.      Vascular: No carotid bruit or JVD.   Cardiovascular:      Rate and Rhythm: Normal rate and regular rhythm.      Pulses: Normal pulses.           Radial pulses are 2+ on the right side and 2+ on the left side.      Heart sounds: Normal heart sounds. No murmur heard.  Pulmonary:      Effort: Pulmonary effort is normal. No respiratory distress.      Breath sounds: Normal breath sounds.   Abdominal:      Palpations: Abdomen is soft.   Musculoskeletal:      Cervical back: Normal range of motion.      Right lower leg: No edema.      Left lower leg: No edema.   Skin:     General: Skin is warm and dry.      Coloration: Skin is not pale.      Findings: No erythema or rash.   Neurological:      General: No focal deficit present.      Mental Status: He is alert and oriented to person, place, and time.      Sensory: No sensory deficit.      Deep Tendon Reflexes: Reflexes are normal and symmetric.   Psychiatric:         Mood and Affect: Mood normal.         Behavior: Behavior normal. Behavior is cooperative.         Thought Content: Thought content normal.         Judgment: Judgment normal.         Assessment & Plan   Diagnoses and all orders for this visit:    1. Anxiety (Primary)    2. Obstructive sleep apnea syndrome  -     Ambulatory Referral to ENT (Otolaryngology)    3. Healthcare maintenance  -     Lipid Panel; Future    4. Scalp pain    Other orders  -     metoclopramide (Reglan) 5 MG tablet; Take 1 tablet by mouth 3 (Three) Times a Day Before Meals.  Dispense: 90 tablet; Refill: 0      Continue  attempts at healthy lifestyle calorie appropriate diet regular physical activity  Acacian provided regarding early detection and screening of chronic problems and prevention of serious illnesses  Patient current with flu vaccine  Follow-up results of testing for chronic and acute problems otherwise as needed or preventatively annually         Answers for HPI/ROS submitted by the patient on 11/11/2022  What is the primary reason for your visit?: Other  Please describe your symptoms.: I wanted to follow-up about my anxiety medicine that I have been taking. Three other things I wanted to discuss are sleep apnea, lately I've been dealing with being very bloated, and dull pain/tenderness on the back right-side of my head. I wanted to cover these topics to get back on track.  Have you had these symptoms before?: Yes  How long have you been having these symptoms?: Greater than 2 weeks  Please list any medications you are currently taking for this condition.: For anxiety, Citalopram. That's the only medication I am taking.  Please describe any probable cause for these symptoms. : For anxiety and sleep apnea, these are two items that are something I've delt with over the years. For the bloating, I'm not for sure. I've looked at what I eat and what I take throughout the day, but I haven't been able to find a connection as to what is causing it. For the dull pain, I'm not for sure on that one either.

## 2022-12-08 RX ORDER — METOCLOPRAMIDE 5 MG/1
TABLET ORAL
Qty: 90 TABLET | Refills: 0 | Status: SHIPPED | OUTPATIENT
Start: 2022-12-08

## 2023-01-03 RX ORDER — CITALOPRAM 10 MG/1
10 TABLET ORAL DAILY
Qty: 30 TABLET | Refills: 1 | Status: SHIPPED | OUTPATIENT
Start: 2023-01-03 | End: 2023-02-28

## 2023-02-07 ENCOUNTER — OFFICE VISIT (OUTPATIENT)
Dept: INTERNAL MEDICINE | Facility: CLINIC | Age: 38
End: 2023-02-07
Payer: COMMERCIAL

## 2023-02-07 VITALS
DIASTOLIC BLOOD PRESSURE: 80 MMHG | HEART RATE: 81 BPM | BODY MASS INDEX: 39.15 KG/M2 | WEIGHT: 243.6 LBS | HEIGHT: 66 IN | OXYGEN SATURATION: 97 % | SYSTOLIC BLOOD PRESSURE: 132 MMHG

## 2023-02-07 DIAGNOSIS — E66.01 CLASS 3 SEVERE OBESITY DUE TO EXCESS CALORIES WITH SERIOUS COMORBIDITY AND BODY MASS INDEX (BMI) OF 40.0 TO 44.9 IN ADULT: ICD-10-CM

## 2023-02-07 DIAGNOSIS — R51.9 SCALP PAIN: ICD-10-CM

## 2023-02-07 DIAGNOSIS — I10 ESSENTIAL HYPERTENSION: ICD-10-CM

## 2023-02-07 DIAGNOSIS — R04.0 EPISTAXIS: Primary | ICD-10-CM

## 2023-02-07 DIAGNOSIS — G47.33 OBSTRUCTIVE SLEEP APNEA SYNDROME: ICD-10-CM

## 2023-02-07 PROCEDURE — 99214 OFFICE O/P EST MOD 30 MIN: CPT | Performed by: FAMILY MEDICINE

## 2023-02-07 RX ORDER — TIZANIDINE 2 MG/1
2 TABLET ORAL NIGHTLY
Qty: 30 TABLET | Refills: 0 | Status: SHIPPED | OUTPATIENT
Start: 2023-02-07

## 2023-02-07 NOTE — PROGRESS NOTES
"Chief Complaint  sleep apnea and bloody noses    Subjective        Rajat Anguiano presents to [facility]  History of Present Illness  Patient follows up for ongoing management of sleep apnea obesity hypertension scalp pain he has a tenderness in the posterior aspect of his scalp more on the right side than the left seems to come and go and be fleeting not related any specific activity.  Does not get headaches he did have an episode a couple situations of having 1 bloody nose blood pressure is more controlled he does try to use CPAP is intolerable so he wants have consultation with ENT for other measures to treat his sleep apnea  Objective   Vital Signs:  /80 (BP Location: Right arm, Patient Position: Sitting, Cuff Size: Large Adult)   Pulse 81   Ht 167.6 cm (66\")   Wt 110 kg (243 lb 9.6 oz)   SpO2 97%   BMI 39.32 kg/m²   Estimated body mass index is 39.32 kg/m² as calculated from the following:    Height as of this encounter: 167.6 cm (66\").    Weight as of this encounter: 110 kg (243 lb 9.6 oz).             Physical Exam  Vitals and nursing note reviewed.   Constitutional:       Appearance: Normal appearance. He is well-developed. He is not diaphoretic.   HENT:      Head: Normocephalic and atraumatic.   Eyes:      General: Lids are normal. No scleral icterus.     Extraocular Movements: Extraocular movements intact.      Conjunctiva/sclera: Conjunctivae normal.   Neck:      Thyroid: No thyroid mass or thyromegaly.      Vascular: No carotid bruit or JVD.   Cardiovascular:      Rate and Rhythm: Normal rate and regular rhythm.      Pulses: Normal pulses.           Radial pulses are 2+ on the right side and 2+ on the left side.      Heart sounds: Normal heart sounds. No murmur heard.  Pulmonary:      Effort: Pulmonary effort is normal. No respiratory distress.      Breath sounds: Normal breath sounds.   Abdominal:      Palpations: Abdomen is soft.   Musculoskeletal:      " Cervical back: Normal range of motion.      Right lower leg: No edema.      Left lower leg: No edema.   Skin:     General: Skin is warm and dry.      Coloration: Skin is not pale.      Findings: No erythema or rash.   Neurological:      General: No focal deficit present.      Mental Status: He is alert and oriented to person, place, and time.      Sensory: No sensory deficit.      Deep Tendon Reflexes: Reflexes are normal and symmetric.   Psychiatric:         Mood and Affect: Mood normal.         Behavior: Behavior normal. Behavior is cooperative.         Thought Content: Thought content normal.         Judgment: Judgment normal.        Result Review :    Common labs    Common Labs 5/2/22   WBC 8.15   Hemoglobin 16.2   Hematocrit 47.1   Platelets 221                        Assessment and Plan   Diagnoses and all orders for this visit:    1. Epistaxis (Primary)  -     Ambulatory Referral to ENT (Otolaryngology)    2. Obstructive sleep apnea syndrome  -     Ambulatory Referral to ENT (Otolaryngology)    3. Class 3 severe obesity due to excess calories with serious comorbidity and body mass index (BMI) of 40.0 to 44.9 in adult (HCC)  -     Hemoglobin A1c    4. Essential hypertension  -     Comprehensive Metabolic Panel  -     TSH  -     Lipid Panel    5. Scalp pain  -     tiZANidine (ZANAFLEX) 2 MG tablet; Take 1 tablet by mouth Every Night.  Dispense: 30 tablet; Refill: 0    Results of testing otherwise as needed or         Follow Up   Return in about 3 months (around 5/7/2023), or if symptoms worsen or fail to improve, for Recheck.  Patient was given instructions and counseling regarding his condition or for health maintenance advice. Please see specific information pulled into the AVS if appropriate.

## 2023-02-28 RX ORDER — CITALOPRAM 10 MG/1
10 TABLET ORAL DAILY
Qty: 30 TABLET | Refills: 1 | Status: SHIPPED | OUTPATIENT
Start: 2023-02-28

## 2023-04-25 RX ORDER — METOCLOPRAMIDE 5 MG/1
TABLET ORAL
Qty: 90 TABLET | Refills: 0 | Status: SHIPPED | OUTPATIENT
Start: 2023-04-25

## 2023-05-03 RX ORDER — CITALOPRAM 10 MG/1
10 TABLET ORAL DAILY
Qty: 30 TABLET | Refills: 0 | Status: SHIPPED | OUTPATIENT
Start: 2023-05-03

## 2023-05-05 ENCOUNTER — TRANSCRIBE ORDERS (OUTPATIENT)
Dept: SLEEP MEDICINE | Facility: HOSPITAL | Age: 38
End: 2023-05-05
Payer: COMMERCIAL

## 2023-05-05 DIAGNOSIS — G47.33 OSA (OBSTRUCTIVE SLEEP APNEA): Primary | ICD-10-CM

## 2023-05-22 ENCOUNTER — HOSPITAL ENCOUNTER (OUTPATIENT)
Dept: SLEEP MEDICINE | Facility: HOSPITAL | Age: 38
Discharge: HOME | End: 2023-05-22
Admitting: INTERNAL MEDICINE
Payer: COMMERCIAL

## 2023-05-22 DIAGNOSIS — G47.33 OSA (OBSTRUCTIVE SLEEP APNEA): ICD-10-CM

## 2023-05-22 PROCEDURE — 95806 SLEEP STUDY UNATT&RESP EFFT: CPT

## 2023-05-31 ENCOUNTER — TELEPHONE (OUTPATIENT)
Dept: SLEEP MEDICINE | Facility: HOSPITAL | Age: 38
End: 2023-05-31

## 2023-05-31 RX ORDER — METOCLOPRAMIDE 5 MG/1
TABLET ORAL
Qty: 90 TABLET | Refills: 0 | Status: SHIPPED | OUTPATIENT
Start: 2023-05-31

## 2023-06-01 RX ORDER — CITALOPRAM 10 MG/1
10 TABLET ORAL DAILY
Qty: 30 TABLET | Refills: 0 | Status: SHIPPED | OUTPATIENT
Start: 2023-06-01

## 2023-07-21 ENCOUNTER — LAB (OUTPATIENT)
Dept: LAB | Facility: HOSPITAL | Age: 38
End: 2023-07-21
Payer: COMMERCIAL

## 2023-07-21 DIAGNOSIS — Z00.00 HEALTHCARE MAINTENANCE: ICD-10-CM

## 2023-07-21 LAB
CHOLEST SERPL-MCNC: 153 MG/DL (ref 0–200)
HDLC SERPL-MCNC: 31 MG/DL (ref 40–60)
LDLC SERPL CALC-MCNC: 96 MG/DL (ref 0–100)
LDLC/HDLC SERPL: 2.98 {RATIO}
TRIGL SERPL-MCNC: 148 MG/DL (ref 0–150)
VLDLC SERPL-MCNC: 26 MG/DL (ref 5–40)

## 2023-07-21 PROCEDURE — 84443 ASSAY THYROID STIM HORMONE: CPT | Performed by: FAMILY MEDICINE

## 2023-07-21 PROCEDURE — 83036 HEMOGLOBIN GLYCOSYLATED A1C: CPT | Performed by: FAMILY MEDICINE

## 2023-07-21 PROCEDURE — 80061 LIPID PANEL: CPT

## 2023-07-21 PROCEDURE — 80053 COMPREHEN METABOLIC PANEL: CPT | Performed by: FAMILY MEDICINE

## 2023-10-31 RX ORDER — CITALOPRAM HYDROBROMIDE 10 MG/1
10 TABLET ORAL DAILY
Qty: 30 TABLET | Refills: 3 | Status: SHIPPED | OUTPATIENT
Start: 2023-10-31

## 2024-02-19 RX ORDER — CITALOPRAM HYDROBROMIDE 10 MG/1
10 TABLET ORAL DAILY
Qty: 30 TABLET | Refills: 3 | Status: SHIPPED | OUTPATIENT
Start: 2024-02-19

## 2024-07-17 RX ORDER — CITALOPRAM HYDROBROMIDE 10 MG/1
10 TABLET ORAL DAILY
Qty: 30 TABLET | Refills: 3 | Status: SHIPPED | OUTPATIENT
Start: 2024-07-17

## 2024-08-29 ENCOUNTER — PATIENT ROUNDING (BHMG ONLY) (OUTPATIENT)
Dept: INTERNAL MEDICINE | Facility: CLINIC | Age: 39
End: 2024-08-29

## 2024-08-29 ENCOUNTER — OFFICE VISIT (OUTPATIENT)
Dept: INTERNAL MEDICINE | Facility: CLINIC | Age: 39
End: 2024-08-29
Payer: COMMERCIAL

## 2024-08-29 ENCOUNTER — LAB (OUTPATIENT)
Dept: LAB | Facility: HOSPITAL | Age: 39
End: 2024-08-29
Payer: COMMERCIAL

## 2024-08-29 VITALS
RESPIRATION RATE: 17 BRPM | DIASTOLIC BLOOD PRESSURE: 86 MMHG | HEIGHT: 66 IN | BODY MASS INDEX: 36.31 KG/M2 | OXYGEN SATURATION: 97 % | HEART RATE: 82 BPM | SYSTOLIC BLOOD PRESSURE: 160 MMHG | WEIGHT: 225.9 LBS | TEMPERATURE: 97.2 F

## 2024-08-29 DIAGNOSIS — Z00.00 HEALTH CARE MAINTENANCE: Primary | ICD-10-CM

## 2024-08-29 DIAGNOSIS — H10.32 ACUTE BACTERIAL CONJUNCTIVITIS OF LEFT EYE: ICD-10-CM

## 2024-08-29 DIAGNOSIS — F41.9 ANXIETY: ICD-10-CM

## 2024-08-29 PROBLEM — E66.812 CLASS 2 SEVERE OBESITY DUE TO EXCESS CALORIES WITH SERIOUS COMORBIDITY AND BODY MASS INDEX (BMI) OF 36.0 TO 36.9 IN ADULT: Status: ACTIVE | Noted: 2021-10-01

## 2024-08-29 LAB
ANION GAP SERPL CALCULATED.3IONS-SCNC: 7 MMOL/L (ref 5–15)
BUN SERPL-MCNC: 12 MG/DL (ref 6–20)
BUN/CREAT SERPL: 12.5 (ref 7–25)
CALCIUM SPEC-SCNC: 9.6 MG/DL (ref 8.6–10.5)
CHLORIDE SERPL-SCNC: 105 MMOL/L (ref 98–107)
CO2 SERPL-SCNC: 25 MMOL/L (ref 22–29)
CREAT SERPL-MCNC: 0.96 MG/DL (ref 0.76–1.27)
EGFRCR SERPLBLD CKD-EPI 2021: 104.4 ML/MIN/1.73
GLUCOSE SERPL-MCNC: 111 MG/DL (ref 65–99)
POTASSIUM SERPL-SCNC: 4.2 MMOL/L (ref 3.5–5.2)
SODIUM SERPL-SCNC: 137 MMOL/L (ref 136–145)

## 2024-08-29 PROCEDURE — 36415 COLL VENOUS BLD VENIPUNCTURE: CPT | Performed by: FAMILY MEDICINE

## 2024-08-29 PROCEDURE — 80048 BASIC METABOLIC PNL TOTAL CA: CPT | Performed by: FAMILY MEDICINE

## 2024-08-29 RX ORDER — TOBRAMYCIN 3 MG/ML
1 SOLUTION/ DROPS OPHTHALMIC EVERY 8 HOURS SCHEDULED
Qty: 5 ML | Refills: 0 | Status: SHIPPED | OUTPATIENT
Start: 2024-08-29

## 2024-08-29 RX ORDER — CITALOPRAM HYDROBROMIDE 10 MG/1
10 TABLET ORAL DAILY
Qty: 90 TABLET | Refills: 3 | Status: SHIPPED | OUTPATIENT
Start: 2024-08-29

## 2024-08-29 NOTE — PROGRESS NOTES
Subjective   Rajat Anguiano is a 39 y.o. male.     Chief Complaint   Patient presents with    Eye Problem     LT eye is pinkish x 3 days     Annual Exam         Eye Problem   Pertinent negatives include no fever.      Rajat Anguiano 39 y.o. male who presents for an Annual Wellness Visit.  he has a history of   Patient Active Problem List   Diagnosis    Essential hypertension    Anxiety    Class 2 severe obesity due to excess calories with serious comorbidity and body mass index (BMI) of 36.0 to 36.9 in adult    Sleep apnea    Scalp pain    Abdominal bloating    Epistaxis    Health care maintenance    Acute bacterial conjunctivitis of left eye   .  he has been feeling well.   I  reviewed health maintenance with him as part of my preventative care plan.  Recommend regular dental and eye exam  The following portions of the patient's history were reviewed and updated as appropriate: allergies, current medications, past family history, past medical history, past social history, past surgical history, and problem list.    Review of Systems   Constitutional:  Negative for appetite change, fever and unexpected weight change.   HENT:  Negative for ear pain, facial swelling and sore throat.    Eyes:  Negative for pain and visual disturbance.   Respiratory:  Negative for chest tightness, shortness of breath and wheezing.    Cardiovascular:  Negative for chest pain and palpitations.   Gastrointestinal:  Negative for abdominal pain and blood in stool.   Endocrine: Negative.    Genitourinary:  Negative for difficulty urinating and hematuria.   Musculoskeletal:  Negative for joint swelling.   Neurological:  Negative for tremors, seizures and syncope.   Hematological:  Negative for adenopathy.   Psychiatric/Behavioral: Negative.         Objective   Physical Exam  Vitals and nursing note reviewed.   Constitutional:       Appearance: Normal appearance. He is well-developed. He is not diaphoretic.   HENT:      Head: Normocephalic and  atraumatic.      Right Ear: Tympanic membrane, ear canal and external ear normal.      Left Ear: Tympanic membrane, ear canal and external ear normal.      Nose: No congestion.      Mouth/Throat:      Pharynx: No posterior oropharyngeal erythema.   Eyes:      General: Lids are normal. No scleral icterus.     Extraocular Movements: Extraocular movements intact.      Pupils: Pupils are equal, round, and reactive to light.      Comments: No change in vision   Neck:      Thyroid: No thyroid mass or thyromegaly.      Vascular: No carotid bruit or JVD.   Cardiovascular:      Rate and Rhythm: Normal rate and regular rhythm.      Pulses: Normal pulses.           Radial pulses are 2+ on the right side and 2+ on the left side.      Heart sounds: Normal heart sounds. No murmur heard.  Pulmonary:      Effort: Pulmonary effort is normal. No respiratory distress.      Breath sounds: Normal breath sounds.   Abdominal:      Palpations: Abdomen is soft.      Tenderness: There is no right CVA tenderness or left CVA tenderness.   Musculoskeletal:      Cervical back: Normal range of motion.      Right lower leg: No edema.      Left lower leg: No edema.   Skin:     General: Skin is warm and dry.      Coloration: Skin is not pale.      Findings: No erythema or rash.   Neurological:      General: No focal deficit present.      Mental Status: He is alert and oriented to person, place, and time.      Sensory: No sensory deficit.      Deep Tendon Reflexes: Reflexes are normal and symmetric.   Psychiatric:         Mood and Affect: Mood normal.         Behavior: Behavior normal. Behavior is cooperative.         Thought Content: Thought content normal.         Judgment: Judgment normal.         Assessment & Plan   Diagnoses and all orders for this visit:    1. Health care maintenance (Primary)  -     Basic Metabolic Panel    2. Acute bacterial conjunctivitis of left eye  -     tobramycin (Tobrex) 0.3 % solution ophthalmic solution; Administer 1  drop into the left eye Every 8 (Eight) Hours.  Dispense: 5 mL; Refill: 0    Other orders  -     citalopram (CeleXA) 10 MG tablet; Take 1 tablet by mouth Daily.  Dispense: 90 tablet; Refill: 3      Recommend continue healthy lifestyle calorie appropriate diet and regular physical activity  Education provided regarding prevention of serious illness and immunizations  Follow-up ongoing management of blood pressure otherwise acutely or preventatively annually

## 2024-08-29 NOTE — PROGRESS NOTES
"Chief Complaint  Eye Problem (LT eye is pinkish x 3 days ) and Annual Exam    Subjective        Rajat Anguiano presents to [facility]  Eye Problem       Patient appointment for left eye irritation over the last 3 days had recent upper respiratory infection which is resolving no cough fever sweats or chills  No known other exposures  Taken some over-the-counter cold medicines history of high blood pressure but has not been taking blood pressure medicine lately and he is not monitoring  He has underlying anxiety depression doing well Selexid like continue same  Objective   Vital Signs:  /86 (BP Location: Left arm, Patient Position: Sitting, Cuff Size: Adult)   Pulse 82   Temp 97.2 °F (36.2 °C) (Temporal)   Resp 17   Ht 167.6 cm (65.98\")   Wt 102 kg (225 lb 14.4 oz)   SpO2 97%   BMI 36.48 kg/m²   Estimated body mass index is 36.48 kg/m² as calculated from the following:    Height as of this encounter: 167.6 cm (65.98\").    Weight as of this encounter: 102 kg (225 lb 14.4 oz).            Physical Exam  Vitals and nursing note reviewed.   Constitutional:       Appearance: Normal appearance. He is well-developed. He is not diaphoretic.   HENT:      Head: Normocephalic and atraumatic.      Right Ear: Tympanic membrane, ear canal and external ear normal.      Left Ear: Tympanic membrane, ear canal and external ear normal.   Eyes:      General: Lids are normal. No scleral icterus.     Comments: Left eye erythematous conjunctiva normal vision   Neck:      Thyroid: No thyroid mass or thyromegaly.      Vascular: No carotid bruit or JVD.   Cardiovascular:      Rate and Rhythm: Normal rate and regular rhythm.      Pulses: Normal pulses.           Radial pulses are 2+ on the right side and 2+ on the left side.      Heart sounds: Normal heart sounds. No murmur heard.  Pulmonary:      Effort: Pulmonary effort is normal. No respiratory distress.      Breath sounds: Normal breath sounds. "   Abdominal:      Palpations: Abdomen is soft.   Musculoskeletal:      Cervical back: Normal range of motion.      Right lower leg: No edema.      Left lower leg: No edema.   Skin:     General: Skin is warm and dry.      Coloration: Skin is not pale.      Findings: No erythema or rash.   Neurological:      General: No focal deficit present.      Mental Status: He is alert and oriented to person, place, and time.      Sensory: No sensory deficit.      Deep Tendon Reflexes: Reflexes are normal and symmetric.   Psychiatric:         Mood and Affect: Mood normal.         Behavior: Behavior normal. Behavior is cooperative.         Thought Content: Thought content normal.         Judgment: Judgment normal.        Result Review :    July 2023 BUN 11 creatinine 0.95 TSH 2.4            Assessment and Plan   Diagnoses and all orders for this visit:    1. Health care maintenance (Primary)  -     Basic Metabolic Panel    2. Acute bacterial conjunctivitis of left eye  -     tobramycin (Tobrex) 0.3 % solution ophthalmic solution; Administer 1 drop into the left eye Every 8 (Eight) Hours.  Dispense: 5 mL; Refill: 0    3. Anxiety    Other orders  -     citalopram (CeleXA) 10 MG tablet; Take 1 tablet by mouth Daily.  Dispense: 90 tablet; Refill: 3    Treated with tobramycin  Monitor blood pressure goals less than 140/90 patient will call in a month update blood pressure readings  Anxiety continue Celexa  Follow-up otherwise as needed or         Follow Up   Return in about 6 months (around 2/28/2025), or if symptoms worsen or fail to improve, for Recheck.  Patient was given instructions and counseling regarding his condition or for health maintenance advice. Please see specific information pulled into the AVS if appropriate.

## 2024-08-29 NOTE — PROGRESS NOTES
August 29, 2024    Hello, may I speak with Rajat Anguiano?    My name is john      I am  with [facility]  [address]  [place] [address] [phone].    Before we get started may I verify your date of birth? 1985    I am calling to officially welcome you to our practice and ask about your recent visit. Is this a good time to talk? yes    Tell me about your visit with us. What things went well?  yes       We're always looking for ways to make our patients' experiences even better. Do you have recommendations on ways we may improve?  no    Overall were you satisfied with your first visit to our practice? yes       I appreciate you taking the time to speak with me today. Is there anything else I can do for you? no      Thank you, and have a great day.

## 2024-08-30 DIAGNOSIS — R73.9 HYPERGLYCEMIA: Primary | ICD-10-CM

## 2024-11-25 RX ORDER — CITALOPRAM HYDROBROMIDE 10 MG/1
10 TABLET ORAL DAILY
Qty: 90 TABLET | Refills: 3 | Status: CANCELLED | OUTPATIENT
Start: 2024-11-25

## 2024-11-27 RX ORDER — CITALOPRAM HYDROBROMIDE 20 MG/1
20 TABLET ORAL DAILY
Qty: 90 TABLET | Refills: 1 | Status: SHIPPED | OUTPATIENT
Start: 2024-11-27

## 2025-04-21 ENCOUNTER — OFFICE VISIT (OUTPATIENT)
Dept: INTERNAL MEDICINE | Facility: CLINIC | Age: 40
End: 2025-04-21
Payer: COMMERCIAL

## 2025-04-21 VITALS
HEART RATE: 66 BPM | HEIGHT: 66 IN | TEMPERATURE: 98 F | BODY MASS INDEX: 37.37 KG/M2 | DIASTOLIC BLOOD PRESSURE: 76 MMHG | SYSTOLIC BLOOD PRESSURE: 138 MMHG | WEIGHT: 232.5 LBS | OXYGEN SATURATION: 98 %

## 2025-04-21 DIAGNOSIS — K11.20 SIALOADENITIS OF SUBMANDIBULAR GLAND: Primary | ICD-10-CM

## 2025-04-21 PROCEDURE — 99213 OFFICE O/P EST LOW 20 MIN: CPT | Performed by: FAMILY MEDICINE

## 2025-04-21 RX ORDER — AMOXICILLIN 500 MG/1
500 CAPSULE ORAL 3 TIMES DAILY
Qty: 30 CAPSULE | Refills: 0 | Status: SHIPPED | OUTPATIENT
Start: 2025-04-21

## 2025-04-21 NOTE — PROGRESS NOTES
"Chief Complaint  Jaw Pain (Left side )    Subjective        Rajat Anguiano presents to [facility]  History of Present Illness  Patient appointment for 2 days of jaw pain left side no specific trauma points to submandibular gland area no redness no fever sweats or chills no change in diet  Objective   Vital Signs:  /76   Pulse 66   Temp 98 °F (36.7 °C)   Ht 167.6 cm (66\")   Wt 105 kg (232 lb 8 oz)   SpO2 98%   BMI 37.53 kg/m²   Estimated body mass index is 37.53 kg/m² as calculated from the following:    Height as of this encounter: 167.6 cm (66\").    Weight as of this encounter: 105 kg (232 lb 8 oz).            Physical Exam  Vitals and nursing note reviewed.   Constitutional:       Appearance: He is obese. He is not ill-appearing.   HENT:      Right Ear: Tympanic membrane, ear canal and external ear normal.      Left Ear: Tympanic membrane, ear canal and external ear normal.      Nose: No congestion.      Mouth/Throat:      Pharynx: No posterior oropharyngeal erythema.   Eyes:      General: No scleral icterus.  Neck:        Comments: Tender less than 1 cm nodule left submandibular gland  Lymphadenopathy:      Cervical: No cervical adenopathy.   Skin:     General: Skin is warm and dry.   Neurological:      Mental Status: He is alert.        Result Review :                Assessment and Plan   Diagnoses and all orders for this visit:    1. Sialoadenitis of submandibular gland (Primary)  -     amoxicillin (AMOXIL) 500 MG capsule; Take 1 capsule by mouth 3 (Three) Times a Day.  Dispense: 30 capsule; Refill: 0    Recommend sialagogues such as lemon heads or hard candy to help stimulate saliva production  Patient has appointment with dentist next week         Follow Up   Return if symptoms worsen or fail to improve, for Recheck.  Patient was given instructions and counseling regarding his condition or for health maintenance advice. Please see specific information pulled into the " AVS if appropriate.

## 2025-05-30 RX ORDER — CITALOPRAM HYDROBROMIDE 20 MG/1
20 TABLET ORAL DAILY
Qty: 90 TABLET | Refills: 1 | Status: SHIPPED | OUTPATIENT
Start: 2025-05-30

## 2025-05-30 NOTE — TELEPHONE ENCOUNTER
Rx Refill Note  Requested Prescriptions     Pending Prescriptions Disp Refills    citalopram (CeleXA) 20 MG tablet 90 tablet 1     Sig: Take 1 tablet by mouth Daily.      Last office visit with prescribing clinician: 4/21/2025   Last telemedicine visit with prescribing clinician: Visit date not found   Next office visit with prescribing clinician: Visit date not found       Virginia Cardenas  05/30/25, 14:20 EDT

## 2025-08-07 ENCOUNTER — OFFICE VISIT (OUTPATIENT)
Dept: INTERNAL MEDICINE | Facility: CLINIC | Age: 40
End: 2025-08-07
Payer: COMMERCIAL

## 2025-08-07 VITALS
HEIGHT: 66 IN | HEART RATE: 66 BPM | DIASTOLIC BLOOD PRESSURE: 78 MMHG | SYSTOLIC BLOOD PRESSURE: 122 MMHG | TEMPERATURE: 98.1 F | OXYGEN SATURATION: 97 % | BODY MASS INDEX: 37.41 KG/M2 | WEIGHT: 232.8 LBS

## 2025-08-07 DIAGNOSIS — G89.29 CHRONIC RIGHT-SIDED LOW BACK PAIN WITH RIGHT-SIDED SCIATICA: Primary | ICD-10-CM

## 2025-08-07 DIAGNOSIS — K62.5 RECTAL BLEEDING: ICD-10-CM

## 2025-08-07 DIAGNOSIS — G44.89 OTHER HEADACHE SYNDROME: ICD-10-CM

## 2025-08-07 DIAGNOSIS — M54.41 CHRONIC RIGHT-SIDED LOW BACK PAIN WITH RIGHT-SIDED SCIATICA: Primary | ICD-10-CM

## 2025-08-07 PROCEDURE — 99214 OFFICE O/P EST MOD 30 MIN: CPT | Performed by: FAMILY MEDICINE

## 2025-08-08 ENCOUNTER — HOSPITAL ENCOUNTER (EMERGENCY)
Facility: HOSPITAL | Age: 40
Discharge: HOME | End: 2025-08-09
Attending: EMERGENCY MEDICINE
Payer: COMMERCIAL

## 2025-08-08 DIAGNOSIS — K62.5 RECTAL BLEEDING: Primary | ICD-10-CM

## 2025-08-08 LAB
ALBUMIN SERPL-MCNC: 4.5 G/DL (ref 3.5–5.2)
ALBUMIN/GLOB SERPL: 1.7 G/DL
ALP SERPL-CCNC: 76 U/L (ref 39–117)
ALT SERPL W P-5'-P-CCNC: 31 U/L (ref 1–41)
ANION GAP SERPL CALCULATED.3IONS-SCNC: 9.3 MMOL/L (ref 5–15)
AST SERPL-CCNC: 23 U/L (ref 1–40)
BASOPHILS # BLD AUTO: 0.03 10*3/MM3 (ref 0–0.2)
BASOPHILS # BLD AUTO: 0.04 10*3/MM3 (ref 0–0.2)
BASOPHILS NFR BLD AUTO: 0.3 % (ref 0–1.5)
BASOPHILS NFR BLD AUTO: 0.6 % (ref 0–1.5)
BILIRUB SERPL-MCNC: 0.4 MG/DL (ref 0–1.2)
BUN SERPL-MCNC: 13.4 MG/DL (ref 6–20)
BUN/CREAT SERPL: 14.3 (ref 7–25)
CALCIUM SPEC-SCNC: 9.7 MG/DL (ref 8.6–10.5)
CHLORIDE SERPL-SCNC: 105 MMOL/L (ref 98–107)
CO2 SERPL-SCNC: 24.7 MMOL/L (ref 22–29)
CREAT SERPL-MCNC: 0.94 MG/DL (ref 0.76–1.27)
DEPRECATED RDW RBC AUTO: 39 FL (ref 37–54)
EGFRCR SERPLBLD CKD-EPI 2021: 106.4 ML/MIN/1.73
EOSINOPHIL # BLD AUTO: 0.12 10*3/MM3 (ref 0–0.4)
EOSINOPHIL # BLD AUTO: 0.13 10*3/MM3 (ref 0–0.4)
EOSINOPHIL NFR BLD AUTO: 1.3 % (ref 0.3–6.2)
EOSINOPHIL NFR BLD AUTO: 1.7 % (ref 0.3–6.2)
ERYTHROCYTE [DISTWIDTH] IN BLOOD BY AUTOMATED COUNT: 12.2 % (ref 12.3–15.4)
ERYTHROCYTE [DISTWIDTH] IN BLOOD BY AUTOMATED COUNT: 12.6 % (ref 12.3–15.4)
GLOBULIN UR ELPH-MCNC: 2.6 GM/DL
GLUCOSE SERPL-MCNC: 121 MG/DL (ref 65–99)
HCT VFR BLD AUTO: 44.8 % (ref 37.5–51)
HCT VFR BLD AUTO: 46.2 % (ref 37.5–51)
HGB BLD-MCNC: 15.1 G/DL (ref 13–17.7)
HGB BLD-MCNC: 15.4 G/DL (ref 13–17.7)
IMM GRANULOCYTES # BLD AUTO: 0.05 10*3/MM3 (ref 0–0.05)
IMM GRANULOCYTES # BLD AUTO: 0.06 10*3/MM3 (ref 0–0.05)
IMM GRANULOCYTES NFR BLD AUTO: 0.5 % (ref 0–0.5)
IMM GRANULOCYTES NFR BLD AUTO: 0.9 % (ref 0–0.5)
LYMPHOCYTES # BLD AUTO: 2.23 10*3/MM3 (ref 0.7–3.1)
LYMPHOCYTES # BLD AUTO: 3.15 10*3/MM3 (ref 0.7–3.1)
LYMPHOCYTES NFR BLD AUTO: 32 % (ref 19.6–45.3)
LYMPHOCYTES NFR BLD AUTO: 32.4 % (ref 19.6–45.3)
MCH RBC QN AUTO: 29.1 PG (ref 26.6–33)
MCH RBC QN AUTO: 29.7 PG (ref 26.6–33)
MCHC RBC AUTO-ENTMCNC: 33.3 G/DL (ref 31.5–35.7)
MCHC RBC AUTO-ENTMCNC: 33.7 G/DL (ref 31.5–35.7)
MCV RBC AUTO: 86.3 FL (ref 79–97)
MCV RBC AUTO: 89.2 FL (ref 79–97)
MONOCYTES # BLD AUTO: 0.65 10*3/MM3 (ref 0.1–0.9)
MONOCYTES # BLD AUTO: 1.14 10*3/MM3 (ref 0.1–0.9)
MONOCYTES NFR BLD AUTO: 11.6 % (ref 5–12)
MONOCYTES NFR BLD AUTO: 9.4 % (ref 5–12)
NEUTROPHILS # BLD AUTO: 3.79 10*3/MM3 (ref 1.7–7)
NEUTROPHILS NFR BLD AUTO: 5.35 10*3/MM3 (ref 1.7–7)
NEUTROPHILS NFR BLD AUTO: 54.3 % (ref 42.7–76)
NEUTROPHILS NFR BLD AUTO: 55 % (ref 42.7–76)
NRBC BLD AUTO-RTO: 0 /100 WBC (ref 0–0.2)
PLATELET # BLD AUTO: 196 10*3/MM3 (ref 140–450)
PLATELET # BLD AUTO: 197 10*3/MM3 (ref 140–450)
PMV BLD AUTO: 10.5 FL (ref 6–12)
POTASSIUM SERPL-SCNC: 3.9 MMOL/L (ref 3.5–5.2)
PROT SERPL-MCNC: 7.1 G/DL (ref 6–8.5)
RBC # BLD AUTO: 5.18 10*6/MM3 (ref 4.14–5.8)
RBC # BLD AUTO: 5.19 10*6/MM3 (ref 4.14–5.8)
SODIUM SERPL-SCNC: 139 MMOL/L (ref 136–145)
WBC # BLD AUTO: 6.89 10*3/MM3 (ref 3.4–10.8)
WBC NRBC COR # BLD AUTO: 9.85 10*3/MM3 (ref 3.4–10.8)

## 2025-08-08 PROCEDURE — 99283 EMERGENCY DEPT VISIT LOW MDM: CPT | Performed by: EMERGENCY MEDICINE

## 2025-08-08 PROCEDURE — 85025 COMPLETE CBC W/AUTO DIFF WBC: CPT

## 2025-08-08 PROCEDURE — 80053 COMPREHEN METABOLIC PANEL: CPT

## 2025-08-09 VITALS
HEART RATE: 77 BPM | WEIGHT: 232 LBS | DIASTOLIC BLOOD PRESSURE: 88 MMHG | RESPIRATION RATE: 18 BRPM | SYSTOLIC BLOOD PRESSURE: 153 MMHG | BODY MASS INDEX: 37.28 KG/M2 | OXYGEN SATURATION: 96 % | HEIGHT: 66 IN | TEMPERATURE: 99.3 F

## 2025-08-09 LAB — HEMOCCULT STL QL: NEGATIVE

## 2025-08-09 PROCEDURE — 82272 OCCULT BLD FECES 1-3 TESTS: CPT | Performed by: EMERGENCY MEDICINE

## 2025-08-13 ENCOUNTER — APPOINTMENT (OUTPATIENT)
Dept: GENERAL RADIOLOGY | Facility: HOSPITAL | Age: 40
End: 2025-08-13
Payer: COMMERCIAL

## 2025-08-13 ENCOUNTER — OFFICE VISIT (OUTPATIENT)
Dept: INTERNAL MEDICINE | Facility: CLINIC | Age: 40
End: 2025-08-13
Payer: COMMERCIAL

## 2025-08-13 ENCOUNTER — HOSPITAL ENCOUNTER (EMERGENCY)
Facility: HOSPITAL | Age: 40
Discharge: HOME | End: 2025-08-13
Attending: STUDENT IN AN ORGANIZED HEALTH CARE EDUCATION/TRAINING PROGRAM
Payer: COMMERCIAL

## 2025-08-13 VITALS
HEIGHT: 66 IN | TEMPERATURE: 98.1 F | BODY MASS INDEX: 37.28 KG/M2 | RESPIRATION RATE: 18 BRPM | HEART RATE: 77 BPM | DIASTOLIC BLOOD PRESSURE: 93 MMHG | SYSTOLIC BLOOD PRESSURE: 165 MMHG | WEIGHT: 232 LBS | OXYGEN SATURATION: 98 %

## 2025-08-13 VITALS
OXYGEN SATURATION: 97 % | HEIGHT: 66 IN | WEIGHT: 232.8 LBS | HEART RATE: 67 BPM | TEMPERATURE: 98 F | DIASTOLIC BLOOD PRESSURE: 82 MMHG | BODY MASS INDEX: 37.41 KG/M2 | SYSTOLIC BLOOD PRESSURE: 124 MMHG

## 2025-08-13 DIAGNOSIS — S99.921A INJURY OF RIGHT FOOT, INITIAL ENCOUNTER: Primary | ICD-10-CM

## 2025-08-13 DIAGNOSIS — R14.0 ABDOMINAL BLOATING: ICD-10-CM

## 2025-08-13 DIAGNOSIS — S91.311A LACERATION OF RIGHT FOOT, INITIAL ENCOUNTER: ICD-10-CM

## 2025-08-13 DIAGNOSIS — G44.89 OTHER HEADACHE SYNDROME: ICD-10-CM

## 2025-08-13 DIAGNOSIS — K62.5 RECTAL BLEEDING: Primary | ICD-10-CM

## 2025-08-13 PROBLEM — R04.0 EPISTAXIS: Status: RESOLVED | Noted: 2023-02-07 | Resolved: 2025-08-13

## 2025-08-13 PROBLEM — H10.32 ACUTE BACTERIAL CONJUNCTIVITIS OF LEFT EYE: Status: RESOLVED | Noted: 2024-08-29 | Resolved: 2025-08-13

## 2025-08-13 PROBLEM — K11.20 SIALOADENITIS OF SUBMANDIBULAR GLAND: Status: RESOLVED | Noted: 2025-04-21 | Resolved: 2025-08-13

## 2025-08-13 LAB
BILIRUB BLD-MCNC: NEGATIVE MG/DL
CLARITY, POC: CLEAR
COLOR UR: YELLOW
EXPIRATION DATE: NORMAL
GLUCOSE UR STRIP-MCNC: NEGATIVE MG/DL
KETONES UR QL: NEGATIVE
LEUKOCYTE EST, POC: NEGATIVE
Lab: NORMAL
NITRITE UR-MCNC: NEGATIVE MG/ML
PH UR: 6 [PH] (ref 5–8)
PROT UR STRIP-MCNC: NEGATIVE MG/DL
RBC # UR STRIP: NEGATIVE /UL
SP GR UR: 1.01 (ref 1–1.03)
UROBILINOGEN UR QL: NORMAL

## 2025-08-13 PROCEDURE — 90715 TDAP VACCINE 7 YRS/> IM: CPT | Performed by: STUDENT IN AN ORGANIZED HEALTH CARE EDUCATION/TRAINING PROGRAM

## 2025-08-13 PROCEDURE — 99283 EMERGENCY DEPT VISIT LOW MDM: CPT | Performed by: STUDENT IN AN ORGANIZED HEALTH CARE EDUCATION/TRAINING PROGRAM

## 2025-08-13 PROCEDURE — 99213 OFFICE O/P EST LOW 20 MIN: CPT | Performed by: FAMILY MEDICINE

## 2025-08-13 PROCEDURE — 90471 IMMUNIZATION ADMIN: CPT | Performed by: STUDENT IN AN ORGANIZED HEALTH CARE EDUCATION/TRAINING PROGRAM

## 2025-08-13 PROCEDURE — 81003 URINALYSIS AUTO W/O SCOPE: CPT | Performed by: FAMILY MEDICINE

## 2025-08-13 PROCEDURE — 73630 X-RAY EXAM OF FOOT: CPT

## 2025-08-13 PROCEDURE — 25010000002 TETANUS-DIPHTH-ACELL PERTUSSIS 5-2.5-18.5 LF-MCG/0.5 SUSPENSION PREFILLED SYRINGE: Performed by: STUDENT IN AN ORGANIZED HEALTH CARE EDUCATION/TRAINING PROGRAM

## 2025-08-13 RX ORDER — DICYCLOMINE HYDROCHLORIDE 10 MG/1
10 CAPSULE ORAL
Qty: 90 CAPSULE | Refills: 0 | Status: SHIPPED | OUTPATIENT
Start: 2025-08-13

## 2025-08-13 RX ADMIN — TETANUS TOXOID, REDUCED DIPHTHERIA TOXOID AND ACELLULAR PERTUSSIS VACCINE, ADSORBED 0.5 ML: 5; 2.5; 8; 8; 2.5 SUSPENSION INTRAMUSCULAR at 22:56

## 2025-08-14 PROCEDURE — 12001 RPR S/N/AX/GEN/TRNK 2.5CM/<: CPT | Performed by: STUDENT IN AN ORGANIZED HEALTH CARE EDUCATION/TRAINING PROGRAM
